# Patient Record
Sex: FEMALE | Race: WHITE | Employment: UNEMPLOYED | URBAN - METROPOLITAN AREA
[De-identification: names, ages, dates, MRNs, and addresses within clinical notes are randomized per-mention and may not be internally consistent; named-entity substitution may affect disease eponyms.]

---

## 2018-05-11 ENCOUNTER — HOSPITAL ENCOUNTER (EMERGENCY)
Age: 23
Discharge: HOME | End: 2018-05-11
Payer: COMMERCIAL

## 2018-05-11 ENCOUNTER — APPOINTMENT (OUTPATIENT)
Dept: GENERAL RADIOLOGY | Age: 23
End: 2018-05-11
Payer: COMMERCIAL

## 2018-05-11 VITALS
DIASTOLIC BLOOD PRESSURE: 83 MMHG | SYSTOLIC BLOOD PRESSURE: 136 MMHG | OXYGEN SATURATION: 98 % | RESPIRATION RATE: 16 BRPM | TEMPERATURE: 98.3 F | HEART RATE: 113 BPM

## 2018-05-11 DIAGNOSIS — L03.012 PARONYCHIA OF FINGER OF LEFT HAND: Primary | ICD-10-CM

## 2018-05-11 PROCEDURE — 99212 OFFICE O/P EST SF 10 MIN: CPT

## 2018-05-11 PROCEDURE — 73130 X-RAY EXAM OF HAND: CPT

## 2018-05-11 RX ORDER — NAPROXEN 500 MG/1
500 TABLET ORAL 2 TIMES DAILY
Qty: 14 TABLET | Refills: 0 | Status: SHIPPED | OUTPATIENT
Start: 2018-05-11 | End: 2018-11-23 | Stop reason: SDUPTHER

## 2018-05-11 RX ORDER — CEPHALEXIN 500 MG/1
500 CAPSULE ORAL 3 TIMES DAILY
Qty: 21 CAPSULE | Refills: 0 | Status: SHIPPED | OUTPATIENT
Start: 2018-05-11 | End: 2018-05-18

## 2018-05-11 ASSESSMENT — PAIN DESCRIPTION - DESCRIPTORS: DESCRIPTORS: ACHING;DISCOMFORT;THROBBING

## 2018-05-11 ASSESSMENT — PAIN DESCRIPTION - PROGRESSION: CLINICAL_PROGRESSION: GRADUALLY WORSENING

## 2018-05-11 ASSESSMENT — PAIN DESCRIPTION - PAIN TYPE: TYPE: ACUTE PAIN

## 2018-05-11 ASSESSMENT — PAIN SCALES - GENERAL: PAINLEVEL_OUTOF10: 8

## 2018-05-11 ASSESSMENT — PAIN DESCRIPTION - ORIENTATION: ORIENTATION: LEFT

## 2018-05-11 ASSESSMENT — PAIN DESCRIPTION - FREQUENCY: FREQUENCY: CONTINUOUS

## 2018-05-11 ASSESSMENT — PAIN DESCRIPTION - LOCATION: LOCATION: FINGER (COMMENT WHICH ONE)

## 2018-07-11 ENCOUNTER — HOSPITAL ENCOUNTER (EMERGENCY)
Age: 23
Discharge: HOME | End: 2018-07-11
Payer: COMMERCIAL

## 2018-07-11 VITALS
BODY MASS INDEX: 36.15 KG/M2 | WEIGHT: 224 LBS | OXYGEN SATURATION: 100 % | SYSTOLIC BLOOD PRESSURE: 148 MMHG | HEART RATE: 112 BPM | DIASTOLIC BLOOD PRESSURE: 91 MMHG | RESPIRATION RATE: 14 BRPM | TEMPERATURE: 98.7 F

## 2018-07-11 DIAGNOSIS — H66.90 ACUTE OTITIS MEDIA, UNSPECIFIED OTITIS MEDIA TYPE: Primary | ICD-10-CM

## 2018-07-11 DIAGNOSIS — J02.9 ACUTE PHARYNGITIS, UNSPECIFIED ETIOLOGY: ICD-10-CM

## 2018-07-11 LAB — STREP GRP A PCR: NEGATIVE

## 2018-07-11 PROCEDURE — 99212 OFFICE O/P EST SF 10 MIN: CPT

## 2018-07-11 PROCEDURE — 87880 STREP A ASSAY W/OPTIC: CPT

## 2018-07-11 RX ORDER — AMOXICILLIN 500 MG/1
500 CAPSULE ORAL 3 TIMES DAILY
Qty: 30 CAPSULE | Refills: 0 | Status: SHIPPED | OUTPATIENT
Start: 2018-07-11 | End: 2018-07-21

## 2018-07-11 ASSESSMENT — PAIN SCALES - GENERAL: PAINLEVEL_OUTOF10: 5

## 2018-07-11 ASSESSMENT — PAIN DESCRIPTION - DESCRIPTORS: DESCRIPTORS: ACHING

## 2018-07-11 ASSESSMENT — PAIN DESCRIPTION - LOCATION: LOCATION: HEAD

## 2018-07-11 ASSESSMENT — PAIN DESCRIPTION - PAIN TYPE: TYPE: ACUTE PAIN

## 2018-07-11 NOTE — ED PROVIDER NOTES
or PTA. Neck:  Supple, full ROM, no asymmetry, no meningeal signs. No stridor. There is no  anterior cervical node tenderness. Lungs:  Clear to auscultation and breath sounds equal.    CV: Regular rate and rhythm, normal heart sounds, without pathological murmurs, ectopy, gallops, or rubs. Abdomen:  Soft, nontender, good bowel sounds. No organomegaly,   Skin:  Warm and dry, No rashes, no erythema present. Lymphatics: No lymphangitis. no adenopathy noted. Neurological:  GCS 15, Oriented. Motor functions intact.    -------------------Nursing Notes / Prior Records & Vitals Reviewed Section----------------------   (The nursing notes within the ED encounter, home medications, current encounter or past encounter records and vital signs as below have been reviewed)   BP (!) 148/91   Pulse 112   Temp 98.7 °F (37.1 °C) (Oral)   Resp 14   Wt 224 lb (101.6 kg)   LMP 07/01/2018   SpO2 100%   BMI 36.15 kg/m²   Oxygen Saturation Interpretation: Normal.  -------------------------------------------Test Results Section---------------------------------------------  (All laboratory and radiology results have been personally reviewed by myself)  Laboratory:  Results for orders placed or performed during the hospital encounter of 07/11/18   Strep Screen Group A Throat   Result Value Ref Range    Strep Grp A PCR Negative Negative       Radiology: All Radiology results interpreted by Radiologist unless otherwise noted. No orders to display     -----------------------------ED Course / Medical Decision Making Section--------------------------  ED Course Medications:  Medications - No data to display    Medical Decision Making:   Pharyngitis, rapid strep negative. No evidence of PTA, RP abscess, epiglottitis, ludwigs angina, or other life threatening or deep space infection or airway compromise. Left otitis     Counseling:    The emergency provider has spoken with the patient and discussed todays results, in addition to providing specific details for the plan of care and counseling regarding the diagnosis and prognosis. Questions are answered at this time and they are agreeable with the plan.  ------------------------------------Impression & Disposition Section------------------------------------  Impression(s):  1. Acute otitis media, unspecified otitis media type    2. Acute pharyngitis, unspecified etiology        Disposition:  Disposition: Discharge to home  Patient condition is good    New Prescriptions    AMOXICILLIN (AMOXIL) 500 MG CAPSULE    Take 1 capsule by mouth 3 times daily for 10 days    BENZOCAINE-MENTHOL (CEPACOL SORE THROAT) 15-2.6 MG LOZG LOZENGE    Take 1 lozenge by mouth every 2 hours as needed for Sore Throat     * NOTE: This report was transcribed using voice recognition software. Every effort was made to ensure accuracy; however, inadvertent computerized transcription errors may be present.            KIRAN Schrader - MUKESH  07/11/18 7450

## 2018-08-18 ENCOUNTER — HOSPITAL ENCOUNTER (EMERGENCY)
Age: 23
Discharge: HOME | End: 2018-08-18
Payer: COMMERCIAL

## 2018-08-18 VITALS
RESPIRATION RATE: 16 BRPM | HEART RATE: 108 BPM | DIASTOLIC BLOOD PRESSURE: 80 MMHG | SYSTOLIC BLOOD PRESSURE: 140 MMHG | WEIGHT: 238 LBS | TEMPERATURE: 98.7 F | OXYGEN SATURATION: 98 % | BODY MASS INDEX: 38.41 KG/M2

## 2018-08-18 DIAGNOSIS — T14.8XXA BRUISE: Primary | ICD-10-CM

## 2018-08-18 PROCEDURE — 99212 OFFICE O/P EST SF 10 MIN: CPT

## 2018-08-18 NOTE — ED PROVIDER NOTES
HPI:  8/18/18, Time: 6:20 PM         Angelina Bradford is a 24 y.o. female presenting to the ED for evaluation of her left index fingernail. SHe gets acrylic nails put on and the acrylic nail came off of her  Left index finger. The tip of the real nail is a little discolored and  Patient is concerned that she has a fungal infection on her nail. Review of Systems:   Pertinent positives and negatives are stated within HPI, all other systems reviewed and are negative.          --------------------------------------------- PAST HISTORY ---------------------------------------------  Past Medical History:  has a past medical history of Drug abuse and Hyperhidrosis. Past Surgical History:  has no past surgical history on file. Social History:  reports that she has been smoking Cigarettes. She has been smoking about 1.00 pack per day. She has never used smokeless tobacco. She reports that she does not drink alcohol or use drugs. Family History: family history includes Cancer in her mother. The patients home medications have been reviewed. Allergies: Patient has no known allergies. -------------------------------------------------- RESULTS -------------------------------------------------  All laboratory and radiology results have been personally reviewed by myself   LABS:  No results found for this visit on 08/18/18. RADIOLOGY:  Interpreted by Radiologist.  No orders to display       ------------------------- NURSING NOTES AND VITALS REVIEWED ---------------------------   The nursing notes within the ED encounter and vital signs as below have been reviewed.    BP (!) 140/80   Pulse 108   Temp 98.7 °F (37.1 °C) (Oral)   Resp 16   Wt 238 lb (108 kg)   LMP 08/11/2018   SpO2 98%   BMI 38.41 kg/m²   Oxygen Saturation Interpretation: Normal      ---------------------------------------------------PHYSICAL EXAM--------------------------------------      Constitutional/General: Alert and [facility]

## 2018-11-23 ENCOUNTER — HOSPITAL ENCOUNTER (EMERGENCY)
Age: 23
Discharge: HOME | End: 2018-11-23
Payer: COMMERCIAL

## 2018-11-23 ENCOUNTER — APPOINTMENT (OUTPATIENT)
Dept: GENERAL RADIOLOGY | Age: 23
End: 2018-11-23
Payer: COMMERCIAL

## 2018-11-23 VITALS
WEIGHT: 223 LBS | OXYGEN SATURATION: 95 % | DIASTOLIC BLOOD PRESSURE: 72 MMHG | SYSTOLIC BLOOD PRESSURE: 150 MMHG | HEART RATE: 108 BPM | RESPIRATION RATE: 18 BRPM | TEMPERATURE: 98.6 F | BODY MASS INDEX: 35.84 KG/M2 | HEIGHT: 66 IN

## 2018-11-23 DIAGNOSIS — M79.671 RIGHT FOOT PAIN: ICD-10-CM

## 2018-11-23 DIAGNOSIS — M72.2 PLANTAR FASCIITIS OF RIGHT FOOT: Primary | ICD-10-CM

## 2018-11-23 PROCEDURE — 73630 X-RAY EXAM OF FOOT: CPT

## 2018-11-23 PROCEDURE — 99283 EMERGENCY DEPT VISIT LOW MDM: CPT

## 2018-11-23 RX ORDER — IBUPROFEN 800 MG/1
800 TABLET ORAL EVERY 6 HOURS PRN
COMMUNITY
End: 2019-08-07

## 2018-11-23 RX ORDER — NAPROXEN 500 MG/1
500 TABLET ORAL 2 TIMES DAILY
Qty: 30 TABLET | Refills: 0 | Status: SHIPPED | OUTPATIENT
Start: 2018-11-23 | End: 2021-05-13

## 2018-11-23 ASSESSMENT — PAIN DESCRIPTION - LOCATION: LOCATION: FOOT

## 2018-11-23 ASSESSMENT — PAIN DESCRIPTION - PAIN TYPE: TYPE: ACUTE PAIN

## 2018-11-23 ASSESSMENT — PAIN DESCRIPTION - ORIENTATION: ORIENTATION: RIGHT

## 2018-11-23 ASSESSMENT — PAIN SCALES - GENERAL: PAINLEVEL_OUTOF10: 8

## 2018-11-23 NOTE — ED PROVIDER NOTES
smoked 1.00 pack per day. She has never used smokeless tobacco. She reports that she does not drink alcohol or use drugs. Family History: family history includes Cancer in her mother. The patients home medications have been reviewed. Allergies: Patient has no known allergies. --------------------------------- RESULTS ------------------------------------------  All laboratory and radiology results have been personally reviewed by myself   LABS:  No results found for this visit on 11/23/18. RADIOLOGY:  Interpreted by Radiologist.  XR FOOT RIGHT (MIN 3 VIEWS)   Final Result   No acute findings. ----------------- NURSING NOTES AND VITALS REVIEWED ---------------   The nursing notes within the ED encounter and vital signs as below have been reviewed. BP (!) 150/72   Pulse 108   Temp 98.6 °F (37 °C) (Oral)   Resp 18   Ht 5' 6\" (1.676 m)   Wt 223 lb (101.2 kg)   SpO2 95%   BMI 35.99 kg/m²   Oxygen Saturation Interpretation: Normal      --------------------------------PHYSICAL EXAM------------------------------------      Constitutional/General: Alert and oriented x3, well appearing, non toxic in NAD  Head: NC/AT  Eyes: PERRL, EOMI  Mouth: Oropharynx clear, handling secretions, no trismus  Neck: Supple, full ROM, no meningeal signs  Pulmonary: Lungs clear to auscultation bilaterally, no wheezes, rales, or rhonchi. Not in respiratory distress  Cardiovascular:  Regular rate and rhythm, no murmurs, gallops, or rubs. 2+ distal pulses  Abdomen: Soft, + BS. No distension. Nontender. No palpable rigidity, rebound or guarding  Extremities: Moves all extremities x 3. Exam of right foot without injury or trauma. No visible swelling or redness. She is tender plantar surface right heel. . Warm and well perfused  Skin: warm and dry without rash  Neurologic: GCS 15,  Intact.   No focal deficits  Psych: Normal Affect      ------------------------ ED COURSE/MEDICAL DECISION MAKING----------------------  Medications - No data to display      Medical Decision Making:    X-rays of the right foot are unremarkable. No acute bony abnormality or purpura. By description she could have plantar fasciitis that the pain seems to get worse the longer she is on it. They have recommended anti-inflammatories rest and ice. Follow-up with podiatry if any persistent symptoms. I did not give her anything stronger for pain. She has a small child at home that she is responsible for 100% of the time       Counseling: The emergency provider has spoken with the patient and discussed todays results, in addition to providing specific details for the plan of care and counseling regarding the diagnosis and prognosis. Questions are answered at this time and they are agreeable with the plan.      ------------------------ IMPRESSION AND DISPOSITION -------------------------------    IMPRESSION  1. Plantar fasciitis of right foot    2.  Right foot pain        DISPOSITION  Disposition: Discharge to home  Patient condition is stable                   Shiraz Chamberlain PA-C  11/23/18 2105

## 2019-08-07 ENCOUNTER — HOSPITAL ENCOUNTER (EMERGENCY)
Age: 24
Discharge: HOME | End: 2019-08-07
Payer: COMMERCIAL

## 2019-08-07 VITALS
WEIGHT: 196 LBS | DIASTOLIC BLOOD PRESSURE: 78 MMHG | HEART RATE: 108 BPM | OXYGEN SATURATION: 99 % | BODY MASS INDEX: 31.64 KG/M2 | SYSTOLIC BLOOD PRESSURE: 135 MMHG | TEMPERATURE: 98.7 F | RESPIRATION RATE: 16 BRPM

## 2019-08-07 DIAGNOSIS — J20.9 ACUTE BRONCHITIS DUE TO INFECTION: Primary | ICD-10-CM

## 2019-08-07 PROCEDURE — 99212 OFFICE O/P EST SF 10 MIN: CPT

## 2019-08-07 RX ORDER — AMOXICILLIN 500 MG/1
500 CAPSULE ORAL 3 TIMES DAILY
Qty: 30 CAPSULE | Refills: 0 | Status: SHIPPED | OUTPATIENT
Start: 2019-08-07 | End: 2019-08-17

## 2019-08-07 RX ORDER — METHYLPREDNISOLONE 4 MG/1
TABLET ORAL
Qty: 1 KIT | Refills: 0 | Status: SHIPPED | OUTPATIENT
Start: 2019-08-07 | End: 2019-08-13

## 2019-08-07 RX ORDER — LORAZEPAM 1 MG/1
1 TABLET ORAL EVERY 6 HOURS PRN
COMMUNITY
End: 2021-05-13

## 2019-08-07 ASSESSMENT — PAIN DESCRIPTION - ORIENTATION: ORIENTATION: RIGHT

## 2019-08-07 ASSESSMENT — PAIN DESCRIPTION - LOCATION: LOCATION: EAR

## 2019-08-07 ASSESSMENT — PAIN DESCRIPTION - DESCRIPTORS: DESCRIPTORS: ACHING

## 2019-08-27 ENCOUNTER — HOSPITAL ENCOUNTER (EMERGENCY)
Age: 24
Discharge: HOME | End: 2019-08-27
Payer: COMMERCIAL

## 2019-08-27 VITALS
SYSTOLIC BLOOD PRESSURE: 128 MMHG | OXYGEN SATURATION: 98 % | HEART RATE: 95 BPM | DIASTOLIC BLOOD PRESSURE: 92 MMHG | TEMPERATURE: 98.7 F | RESPIRATION RATE: 18 BRPM

## 2019-08-27 DIAGNOSIS — B00.1 HERPES LABIALIS: Primary | ICD-10-CM

## 2019-08-27 PROCEDURE — 96372 THER/PROPH/DIAG INJ SC/IM: CPT

## 2019-08-27 PROCEDURE — 99282 EMERGENCY DEPT VISIT SF MDM: CPT

## 2019-08-27 PROCEDURE — 6360000002 HC RX W HCPCS: Performed by: NURSE PRACTITIONER

## 2019-08-27 RX ORDER — CEPHALEXIN 500 MG/1
500 CAPSULE ORAL 4 TIMES DAILY
Qty: 28 CAPSULE | Refills: 0 | Status: SHIPPED | OUTPATIENT
Start: 2019-08-27 | End: 2019-09-03

## 2019-08-27 RX ORDER — VALACYCLOVIR HYDROCHLORIDE 1 G/1
2000 TABLET, FILM COATED ORAL 2 TIMES DAILY
Qty: 4 TABLET | Refills: 0 | Status: SHIPPED | OUTPATIENT
Start: 2019-08-27 | End: 2019-08-28

## 2019-08-27 RX ORDER — KETOROLAC TROMETHAMINE 30 MG/ML
30 INJECTION, SOLUTION INTRAMUSCULAR; INTRAVENOUS ONCE
Status: COMPLETED | OUTPATIENT
Start: 2019-08-27 | End: 2019-08-27

## 2019-08-27 RX ADMIN — KETOROLAC TROMETHAMINE 30 MG: 30 INJECTION, SOLUTION INTRAMUSCULAR; INTRAVENOUS at 17:08

## 2019-08-27 ASSESSMENT — PAIN SCALES - GENERAL: PAINLEVEL_OUTOF10: 7

## 2019-08-27 NOTE — ED PROVIDER NOTES
[facility]     Department of Emergency Medicine   ED  Provider Note  Admit Date/RoomTime: 8/27/2019  4:16 PM  ED Room: /[facility]  Chief Complaint   Other (multiple sores around mouth and under tongue. Patient states she was diagnosed with impetigo at another hospital and was getting medication but it is getting worse )    History of Present Illness   Source of history provided by:  patient. History/Exam Limitations: none. Shanna Solitario is a 21 y.o. old female with has a past medical history of:   Past Medical History:   Diagnosis Date    Drug abuse ([facility] [address]     Hyperhidrosis     presents to the emergency department by private vehicle, for complaint of sudden onset red and painful area on  upper and lower lip which began a few day(s) prior to arrival.  The symptoms were caused by no injury, trauma or exposure but does have a history of herpes simplex and felt it was possibly due to that. Was seen at [facility] and diagnosed with impetigo, started on penicillin oral with no additional treatment. Since onset the symptoms have been persistent and have not improved with treatment prescribed. Prior history of similar episodes: Yes, but patient admits that it does look slightly different than her previous episodes of cold sores. Her symptoms are associated with rash and pain and relieved by nothing. She denies any hives, welts, difficulty breathing, difficulty swallowing, wheezing, throat tightness, hoarseness, stridor, itching, lightheadedness, dizziness, facial swelling, lip swelling or tongue swelling. Denies any associated fever or chills. Denies chest pains, shortness of breath, cough with phlegm or hemoptysis. Denies abdominal pains, nausea, vomiting, change in bowel patterns, hematochezia or melena. Denies dysuria, hematuria, suprapubic or flank pains. ROS    Pertinent positives and negatives are stated within HPI, all other systems reviewed and are negative.     No past surgical history on file. Social History:  reports that she has been smoking cigarettes. She has been smoking about 1.00 pack per day. She has never used smokeless tobacco. She reports that she does not drink alcohol or use drugs. Family History: family history includes Cancer in her mother. Allergies: Patient has no known allergies. Physical Exam           ED Triage Vitals   BP Temp Temp Source Pulse Resp SpO2 Height Weight   08/27/19 1607 08/27/19 1549 08/27/19 1549 08/27/19 1549 08/27/19 1607 08/27/19 1549 -- --   (!) 128/92 98.7 °F (37.1 °C) Temporal 80 18 98 %       Oxygen Saturation Interpretation: Normal.    Constitutional:  Alert, development consistent with age. HEENT:  NC/NT. Airway patent. Eyes:  PERRL, EOMI, no discharge. Sclera anicteric. Ears:  TMs without perforation, injection, or bulging. External canals clear without exudate. Mouth:  Mucous membranes moist without lesions, tongue and gums normal.  No buccal, intraoral, sublingual or lingual lesions are noted  Throat:  Pharynx without injection, exudate, or tonsillar hypertrophy. Airway patient. Neck:  Supple. No lymphadenopathy. No stridor  Respiratory:  Clear to auscultation and breath sounds equal. Air entry is normal.  CV:  Regular rate and rhythm. GI:  Abdomen Soft, nontender, +BS. Integument:  Skin turgor: Normal.              Round-2-oval-shaped lesions noted x2 with one being on the left lower lip crossing the vermilion border and 1 Being Right Upper Lip Crossing the vermilion border with pink moist wound bases and yellow crusted drainage noted atop both with no secondary local or extensive cellulitis and no erythema, rash or swelling noted. Neurological:  Orientation age-appropriate unless noted elseware. Motor functions intact. Lab / Imaging Results   (All laboratory and radiology results have been personally reviewed by myself)  Labs:  No results found for this visit on 08/27/19. Imaging:   All Radiology results interpreted

## 2021-03-22 ENCOUNTER — HOSPITAL ENCOUNTER (OUTPATIENT)
Age: 26
Discharge: HOME | End: 2021-03-22
Payer: COMMERCIAL

## 2021-03-22 LAB
ALBUMIN SERPL-MCNC: 4.5 G/DL (ref 3.5–5.2)
ALP BLD-CCNC: 84 U/L (ref 35–104)
ALT SERPL-CCNC: 44 U/L (ref 0–32)
ANION GAP SERPL CALCULATED.3IONS-SCNC: 14 MMOL/L (ref 7–16)
AST SERPL-CCNC: 34 U/L (ref 0–31)
BASOPHILS ABSOLUTE: 0.04 E9/L (ref 0–0.2)
BASOPHILS RELATIVE PERCENT: 0.4 % (ref 0–2)
BILIRUB SERPL-MCNC: 0.3 MG/DL (ref 0–1.2)
BUN BLDV-MCNC: 17 MG/DL (ref 6–20)
CALCIUM SERPL-MCNC: 9.4 MG/DL (ref 8.6–10.2)
CHLORIDE BLD-SCNC: 102 MMOL/L (ref 98–107)
CHOLESTEROL, TOTAL: 204 MG/DL (ref 0–199)
CO2: 23 MMOL/L (ref 22–29)
CREAT SERPL-MCNC: 0.9 MG/DL (ref 0.5–1)
EOSINOPHILS ABSOLUTE: 0.05 E9/L (ref 0.05–0.5)
EOSINOPHILS RELATIVE PERCENT: 0.5 % (ref 0–6)
GFR AFRICAN AMERICAN: >60
GFR NON-AFRICAN AMERICAN: >60 ML/MIN/1.73
GLUCOSE BLD-MCNC: 95 MG/DL (ref 74–99)
HCT VFR BLD CALC: 42.2 % (ref 34–48)
HDLC SERPL-MCNC: 45 MG/DL
HEMOGLOBIN: 14.7 G/DL (ref 11.5–15.5)
IMMATURE GRANULOCYTES #: 0.06 E9/L
IMMATURE GRANULOCYTES %: 0.6 % (ref 0–5)
LDL CHOLESTEROL CALCULATED: 124 MG/DL (ref 0–99)
LYMPHOCYTES ABSOLUTE: 2.41 E9/L (ref 1.5–4)
LYMPHOCYTES RELATIVE PERCENT: 25 % (ref 20–42)
MCH RBC QN AUTO: 32.7 PG (ref 26–35)
MCHC RBC AUTO-ENTMCNC: 34.8 % (ref 32–34.5)
MCV RBC AUTO: 93.8 FL (ref 80–99.9)
MONOCYTES ABSOLUTE: 0.51 E9/L (ref 0.1–0.95)
MONOCYTES RELATIVE PERCENT: 5.3 % (ref 2–12)
NEUTROPHILS ABSOLUTE: 6.57 E9/L (ref 1.8–7.3)
NEUTROPHILS RELATIVE PERCENT: 68.2 % (ref 43–80)
PDW BLD-RTO: 12.2 FL (ref 11.5–15)
PLATELET # BLD: 367 E9/L (ref 130–450)
PMV BLD AUTO: 9.9 FL (ref 7–12)
POTASSIUM SERPL-SCNC: 4.1 MMOL/L (ref 3.5–5)
RBC # BLD: 4.5 E12/L (ref 3.5–5.5)
SODIUM BLD-SCNC: 139 MMOL/L (ref 132–146)
T4 FREE: 0.96 NG/DL (ref 0.93–1.7)
TOTAL PROTEIN: 7.5 G/DL (ref 6.4–8.3)
TRIGL SERPL-MCNC: 173 MG/DL (ref 0–149)
TSH SERPL DL<=0.05 MIU/L-ACNC: 2.28 UIU/ML (ref 0.27–4.2)
VLDLC SERPL CALC-MCNC: 35 MG/DL
WBC # BLD: 9.6 E9/L (ref 4.5–11.5)

## 2021-03-22 PROCEDURE — 80061 LIPID PANEL: CPT

## 2021-03-22 PROCEDURE — 84439 ASSAY OF FREE THYROXINE: CPT

## 2021-03-22 PROCEDURE — 85025 COMPLETE CBC W/AUTO DIFF WBC: CPT

## 2021-03-22 PROCEDURE — 80053 COMPREHEN METABOLIC PANEL: CPT

## 2021-03-22 PROCEDURE — 36415 COLL VENOUS BLD VENIPUNCTURE: CPT

## 2021-03-22 PROCEDURE — 84443 ASSAY THYROID STIM HORMONE: CPT

## 2021-03-31 ENCOUNTER — TELEPHONE (OUTPATIENT)
Dept: SLEEP CENTER | Age: 26
End: 2021-03-31

## 2021-04-14 ENCOUNTER — TELEPHONE (OUTPATIENT)
Dept: SLEEP CENTER | Age: 26
End: 2021-04-14

## 2021-05-13 ENCOUNTER — PROCEDURE VISIT (OUTPATIENT)
Dept: AUDIOLOGY | Age: 26
End: 2021-05-13
Payer: COMMERCIAL

## 2021-05-13 ENCOUNTER — OFFICE VISIT (OUTPATIENT)
Dept: ENT CLINIC | Age: 26
End: 2021-05-13
Payer: COMMERCIAL

## 2021-05-13 VITALS
SYSTOLIC BLOOD PRESSURE: 143 MMHG | WEIGHT: 239 LBS | HEART RATE: 112 BPM | HEIGHT: 66 IN | BODY MASS INDEX: 38.41 KG/M2 | DIASTOLIC BLOOD PRESSURE: 110 MMHG

## 2021-05-13 DIAGNOSIS — J35.01 CHRONIC TONSILLITIS: ICD-10-CM

## 2021-05-13 DIAGNOSIS — J35.8 TONSIL STONE: ICD-10-CM

## 2021-05-13 DIAGNOSIS — Z01.818 PREOP TESTING: ICD-10-CM

## 2021-05-13 DIAGNOSIS — H65.493 COME (CHRONIC OTITIS MEDIA WITH EFFUSION), BILATERAL: Primary | ICD-10-CM

## 2021-05-13 DIAGNOSIS — J35.1 TONSILLAR HYPERTROPHY: ICD-10-CM

## 2021-05-13 DIAGNOSIS — H65.493 COME (CHRONIC OTITIS MEDIA WITH EFFUSION), BILATERAL: ICD-10-CM

## 2021-05-13 LAB
HCT VFR BLD CALC: 41.6 % (ref 34–48)
HEMOGLOBIN: 14.5 G/DL (ref 11.5–15.5)
MCH RBC QN AUTO: 33.6 PG (ref 26–35)
MCHC RBC AUTO-ENTMCNC: 34.9 % (ref 32–34.5)
MCV RBC AUTO: 96.3 FL (ref 80–99.9)
PDW BLD-RTO: 12.9 FL (ref 11.5–15)
PLATELET # BLD: 382 E9/L (ref 130–450)
PMV BLD AUTO: 9.9 FL (ref 7–12)
RBC # BLD: 4.32 E12/L (ref 3.5–5.5)
WBC # BLD: 9.4 E9/L (ref 4.5–11.5)

## 2021-05-13 PROCEDURE — 99203 OFFICE O/P NEW LOW 30 MIN: CPT | Performed by: NURSE PRACTITIONER

## 2021-05-13 PROCEDURE — 92567 TYMPANOMETRY: CPT | Performed by: AUDIOLOGIST

## 2021-05-13 PROCEDURE — G8417 CALC BMI ABV UP PARAM F/U: HCPCS | Performed by: NURSE PRACTITIONER

## 2021-05-13 PROCEDURE — G8427 DOCREV CUR MEDS BY ELIG CLIN: HCPCS | Performed by: NURSE PRACTITIONER

## 2021-05-13 PROCEDURE — 1036F TOBACCO NON-USER: CPT | Performed by: NURSE PRACTITIONER

## 2021-05-13 RX ORDER — CHLORHEXIDINE GLUCONATE 0.12 MG/ML
15 RINSE ORAL 2 TIMES DAILY
Qty: 420 ML | Refills: 0 | Status: SHIPPED
Start: 2021-05-13 | End: 2021-05-14

## 2021-05-13 RX ORDER — VILAZODONE HYDROCHLORIDE 20 MG/1
40 TABLET ORAL DAILY
COMMUNITY
Start: 2021-04-23

## 2021-05-13 RX ORDER — CETIRIZINE HYDROCHLORIDE 10 MG/1
10 TABLET ORAL DAILY
COMMUNITY
Start: 2021-04-21 | End: 2021-05-14

## 2021-05-13 RX ORDER — CLONAZEPAM 0.5 MG/1
TABLET ORAL
COMMUNITY
Start: 2021-03-25 | End: 2021-07-02 | Stop reason: ALTCHOICE

## 2021-05-13 RX ORDER — METHYLPREDNISOLONE 4 MG/1
4 TABLET ORAL SEE ADMIN INSTRUCTIONS
Qty: 1 KIT | Refills: 0 | Status: SHIPPED
Start: 2021-05-13 | End: 2021-05-14

## 2021-05-13 RX ORDER — HYDROXYZINE HYDROCHLORIDE 25 MG/1
TABLET, FILM COATED ORAL
COMMUNITY
Start: 2021-05-06

## 2021-05-13 RX ORDER — AZELASTINE 1 MG/ML
2 SPRAY, METERED NASAL 2 TIMES DAILY
Qty: 4 BOTTLE | Refills: 1 | Status: SHIPPED
Start: 2021-05-13 | End: 2021-05-14

## 2021-05-13 SDOH — HEALTH STABILITY: MENTAL HEALTH: HOW OFTEN DO YOU HAVE A DRINK CONTAINING ALCOHOL?: NOT ASKED

## 2021-05-13 SDOH — HEALTH STABILITY: MENTAL HEALTH: HOW MANY STANDARD DRINKS CONTAINING ALCOHOL DO YOU HAVE ON A TYPICAL DAY?: NOT ASKED

## 2021-05-13 NOTE — PROGRESS NOTES
This patient was referred for tympanometric testing by JUDI Williamson NP due to COME, bilaterally. Tympanometry revealed normal middle ear peak pressure and reduced compliance, bilaterally. Ipsilateral acoustic reflexes were absent, right ear and present, left ear at 1000Hz. The results were reviewed with the patient. Recommendations for follow up will be made pending physician consult.     Froilan Quijano CCC/SCARLETT  Audiologist  [id number]  NPI#:  [id number]

## 2021-05-13 NOTE — PROGRESS NOTES
[id number] [facility]  Dr. Jamey Hodges. RIMA Lopez Ms.Ed. New Consult       Patient Name:  Dominique Ramos  :  1995     CHIEF C/O:    Chief Complaint   Patient presents with    Other     tonsil stones    Ear Problem     chronic ear infection       HISTORY OBTAINED FROM:  patient    HISTORY OF PRESENT ILLNESS:       Vinod  is a 31y.o. year old female, here today for ear infections and tonsil stones. States she has had chronic ear infections since she was a child with no previous history of tubes or other ear surgeries. She does complain of recurrent ear pain with frequent treatment with amoxicillin and drops by her PCP in urgent care. She denies any hearing loss, tinnitus, or vertigo. She states that she is suffering from worsened allergy symptoms this year including congestion, rhinorrhea, postnasal drainage which she is taking Zyrtec for as needed. She also started Flonase 1 month ago. She denies any change in the pressure in her ears. She does have a persistent history of TMJ and bruxism and wears a mouthguard nightly. She also states she has been suffering from tonsil stones for the past 1 year. She states when she is given a Z-Madhu they go away but returned a short time after. She states they are very large with a very foul odor. She complains of a chronic sore throat but denies any difficulty swallowing or changes to her voice. Denies any previous history of chronic strep infections. She states she is a former smoker and currently vapes. Past Medical History:   Diagnosis Date    Drug abuse ([facility] [address]     Hyperhidrosis      History reviewed. No pertinent surgical history.     Current Outpatient Medications:     clonazePAM (KLONOPIN) 0.5 MG tablet, TAKE 1 TABLET BY MOUTH TWICE DAILY AS NEEDED, Disp: , Rfl:     cetirizine (ZYRTEC) 10 MG tablet, Take 10 mg by mouth daily, Disp: , Rfl:     VILAZODONE HCL 20 MG Tablet, TAKE 1 TABLET BY MOUTH ONCE DAILY, Disp: , Rfl:     hydrOXYzine (ATARAX) 25 MG tablet, TAKE 1 TABLET BY MOUTH THREE TIMES DAILY AS NEEDED, Disp: , Rfl:     OMEPRAZOLE PO, Take by mouth, Disp: , Rfl:     glycopyrrolate (ROBINUL) 2 MG tablet, Take 2 mg by mouth 5 times daily 6 mg in the am and 4 mg in the pm. , Disp: , Rfl:   Patient has no known allergies. Social History     Tobacco Use    Smoking status: Former Smoker     Packs/day: 1.00     Types: Cigarettes     Quit date: 2021     Years since quittin.0    Smokeless tobacco: Never Used   Substance Use Topics    Alcohol use: Yes    Drug use: No     Types: IV     Family History   Problem Relation Age of Onset    Cancer Mother        Review of Systems   Constitutional: Negative. Negative for activity change and appetite change. HENT: Positive for congestion, ear pain, postnasal drip, rhinorrhea and sore throat. Negative for ear discharge. Eyes: Negative. Respiratory: Negative. Negative for shortness of breath and stridor. Cardiovascular: Negative. Negative for chest pain and palpitations. Endocrine: Negative. Musculoskeletal: Negative. Skin: Negative. Neurological: Negative. Negative for dizziness. Hematological: Negative. Psychiatric/Behavioral: Negative. BP (!) 143/110 (Site: Left Upper Arm, Position: Sitting, Cuff Size: Large Adult)   Pulse 112   Ht 5' 6\" (1.676 m)   Wt 239 lb (108.4 kg)   LMP 2021   BMI 38.58 kg/m²   Physical Exam  Constitutional:       Appearance: Normal appearance. HENT:      Head: Normocephalic. Right Ear: Ear canal and external ear normal. A middle ear effusion is present. Tympanic membrane is scarred. Tympanic membrane has decreased mobility. Left Ear: Ear canal and external ear normal. A middle ear effusion is present. Tympanic membrane is scarred. Tympanic membrane has decreased mobility. Nose: Rhinorrhea present. Rhinorrhea is clear. Right Turbinates: Pale. Left Turbinates: Pale.       Mouth/Throat: Lips: Pink. Mouth: Mucous membranes are moist.      Pharynx: No oropharyngeal exudate or posterior oropharyngeal erythema. Tonsils: No tonsillar exudate or tonsillar abscesses. 2+ on the right. 2+ on the left. Comments: 2+ cryptic tonsils bilaterally with irregular shape. Eyes:      Conjunctiva/sclera: Conjunctivae normal.      Pupils: Pupils are equal, round, and reactive to light. Cardiovascular:      Rate and Rhythm: Normal rate and regular rhythm. Pulses: Normal pulses. Pulmonary:      Effort: Pulmonary effort is normal. No respiratory distress. Breath sounds: No stridor. Musculoskeletal:         General: Normal range of motion. Cervical back: Normal range of motion. No rigidity. No muscular tenderness. Skin:     General: Skin is warm and dry. Neurological:      General: No focal deficit present. Mental Status: She is alert and oriented to person, place, and time. Psychiatric:         Mood and Affect: Mood normal.         Behavior: Behavior normal.         Thought Content: Thought content normal.         Judgment: Judgment normal.         IMPRESSION/PLAN:  Maria L was seen today for other and ear problem. Diagnoses and all orders for this visit:    COME (chronic otitis media with effusion), bilateral  -     Tympanometry; Future    Tonsillar hypertrophy    Chronic tonsillitis    Tonsil stone    Other orders  -     Discontinue: azelastine (ASTELIN) 0.1 % nasal spray; 2 sprays by Nasal route 2 times daily Use in each nostril as directed  -     Discontinue: chlorhexidine (PERIDEX) 0.12 % solution; Take 15 mLs by mouth 2 times daily for 14 days  -     Discontinue: methylPREDNISolone (MEDROL DOSEPACK) 4 MG tablet; Take 1 tablet by mouth See Admin Instructions for 6 days Take by mouth. Guille Crespo  49. is seen today for multiple complaints including recurrent ear infections, allergy symptoms, and tonsil stones.   Upon exam bilateral TMs are scarred with mild middle ear effusions. There is no sign of current infection. There is tenderness of bilateral TMJ joints. Bilateral ear canals are without erythema or edema. Sinuses are patent bilaterally with mild pallor appearance of the inferior turbinates, clear rhinorrhea, and postnasal drainage. Tonsils are 2+ bilaterally with irregular shape and cryptic appearance. There is no current stones, erythema, or exudate from either tonsil. At this time she will continue her Flonase with education on appropriate use of nasal sprays. She will also start on Astelin spray, 2 sprays each nostril up to twice daily as needed for postnasal drainage. Is recommended that she uses this medication twice daily for at least 2 weeks for her postnasal drainage symptoms. She will be given Peridex mouthwash, 15 mL to be gargled twice daily for 14 days. She is also given a prescription for Medrol Dosepak for her TMJ discomfort with instructions to complete followed by NSAID medications and warm compresses for the TMJs. She will follow-up in 1 month. Possible surgical intervention for her tonsils is discussed with the patient's which she would like to refrain from if possible due to family obligations. We will continue all conservative medical therapy as long as patient tolerates.       Coni Allen, MSN, FNP-C  8 [facility], Nose and Thro[facility]    The information contained in this note has been dictated using drug and medical speech recognition software and may contain errors

## 2021-05-14 ENCOUNTER — HOSPITAL ENCOUNTER (EMERGENCY)
Age: 26
Discharge: HOME | End: 2021-05-14
Payer: COMMERCIAL

## 2021-05-14 ENCOUNTER — HOSPITAL ENCOUNTER (OUTPATIENT)
Age: 26
Discharge: HOME | End: 2021-05-16
Payer: COMMERCIAL

## 2021-05-14 VITALS
HEIGHT: 66 IN | TEMPERATURE: 98 F | OXYGEN SATURATION: 97 % | SYSTOLIC BLOOD PRESSURE: 119 MMHG | WEIGHT: 219 LBS | BODY MASS INDEX: 35.2 KG/M2 | HEART RATE: 118 BPM | DIASTOLIC BLOOD PRESSURE: 88 MMHG | RESPIRATION RATE: 18 BRPM

## 2021-05-14 DIAGNOSIS — N76.0 ABSCESS, VAGINA: Primary | ICD-10-CM

## 2021-05-14 PROCEDURE — U0005 INFEC AGEN DETEC AMPLI PROBE: HCPCS

## 2021-05-14 PROCEDURE — U0003 INFECTIOUS AGENT DETECTION BY NUCLEIC ACID (DNA OR RNA); SEVERE ACUTE RESPIRATORY SYNDROME CORONAVIRUS 2 (SARS-COV-2) (CORONAVIRUS DISEASE [COVID-19]), AMPLIFIED PROBE TECHNIQUE, MAKING USE OF HIGH THROUGHPUT TECHNOLOGIES AS DESCRIBED BY CMS-2020-01-R: HCPCS

## 2021-05-14 PROCEDURE — 99212 OFFICE O/P EST SF 10 MIN: CPT

## 2021-05-14 RX ORDER — SULFAMETHOXAZOLE AND TRIMETHOPRIM 800; 160 MG/1; MG/1
1 TABLET ORAL 2 TIMES DAILY
Qty: 20 TABLET | Refills: 0 | Status: SHIPPED | OUTPATIENT
Start: 2021-05-14 | End: 2021-05-14

## 2021-05-14 ASSESSMENT — PAIN DESCRIPTION - LOCATION: LOCATION: VAGINA

## 2021-05-14 ASSESSMENT — PAIN DESCRIPTION - ORIENTATION: ORIENTATION: RIGHT

## 2021-05-14 NOTE — ED PROVIDER NOTES
[address] [facility]  Department of Emergency Medicine   ED  Encounter Note  Admit Date/RoomTime: 2021  8:53 AM  ED Room:   NAME: Julaino Ramos  : 1995  MRN: [id number]     Chief Complaint:  No chief complaint on file. HISTORY OF PRESENT ILLNESS        Maria L Ramos is a 31 y.o. female who presents to the ED with a complaint of a vaginal sore. Patient states a couple days ago she was feeling very uncomfortable in the vaginal area. Sore and pressure-like. Did actually get in to see her gynecologist yesterday. He diagnosed her with a vaginal wall cyst and scheduled her for outpatient surgery this coming week. Patient states yesterday she became even more uncomfortable in the vaginal area. States that she is feeling a lump to the right side of her vagina. Very tender to touch. Feels swollen and lumpy. States she soaked in a tub to try to help it, but no relief. Did call her gynecology office back. They were concerned that perhaps she has herpes and they did prescribe herpes medication. Patient has not started those medicines. She does admit that she has had cold sores around her mouth. She has never had vaginal herpes that she knows of. She has had the same sexual partner for many years. She denies any vaginal discharge or vaginal odor. Denies any vaginal itching. Patient denies any fevers or chills. She denies any nausea, vomiting or diarrhea. ROS   Pertinent positives and negatives are stated within HPI, all other systems reviewed and are negative. Past Medical History:  has a past medical history of Drug abuse ([address] and Hyperhidrosis. Surgical History:  has no past surgical history on file. Social History:  reports that she quit smoking about 2 weeks ago. Her smoking use included cigarettes. She smoked 1.00 pack per day. She has never used smokeless tobacco. She reports current alcohol use. She reports that she does not use drugs.     Family History: family history includes Cancer in her mother. Allergies: Patient has no known allergies. PHYSICAL EXAM   Oxygen Saturation Interpretation: Normal.        ED Triage Vitals [05/14/21 0859]   BP Temp Temp Source Pulse Resp SpO2 Height Weight   119/88 98 °F (36.7 °C) Infrared 118 18 97 % 5' 6\" (1.676 m) 219 lb (99.3 kg)       General:  NAD. Alert and Oriented. Well-appearing. Skin:  Warm, dry. No rashes. Head:  Normocephalic. Atraumatic. Eyes:  EOMI. Conjunctiva normal.  ENT:  Oral mucosa moist.  Airway patent. Neck:  Supple. Normal ROM. Respiratory:  No respiratory distress. No labored breathing. Lungs clear without rales, rhonchi or wheezing. Cardiovascular:  Regular rate. No Murmur. No peripheral edema. Extremities warm and good color. Chest:  Abdomen:  Soft, nondistended. Normal bowel sounds. Nontender to palpation all 4 quadrants. Negative rebound, negative guarding. Rectal:  Gu: Bladder nontender and non distended. No CVA tenderness. Pelvic: Performed with nurse Iraida at bedside. To the right labia minora patient had a white cystic head. I did pop it easily with just my fingers. After that about 1 cc of pus came out with manual squeezing. I see no further pimple-like lesions. No herpetic lesions at all. No blisters. There is no folliculitis to her pubic area. Speculum exam does not reveal any obvious lesions to the vaginal walls. There is no vaginal discharge. No vaginal odor. No sores, lesions or redness around her rectal area. Extremities:  Normal ROM. Nontender to palpation. Atraumatic. Back:  Normal ROM. Nontender to palpation. Neuro:  Alert and Oriented to person, place, time and situation. Normal LOC. Moves all extremities. Speech fluent. Psych:  Calm and Cooperative. Normal thought process. Normal judgement.         Lab / Imaging Results   (All laboratory and radiology results have been personally reviewed by myself)  Labs:  No results found for this visit on 05/14/21. Imaging: All Radiology results interpreted by Radiologist unless otherwise noted. No orders to display       ED Course / Medical Decision Making   Medications - No data to display     Re-examination:  5/14/21       Time:   Patients condition . Consult(s):   None    Procedure(s):   none    MDM:   This was an abscess. Definitely not a herpetic outbreak. Patient advised to not take the herpes medication until she is seen by her gynecologist again. She is to call the office today and explain what happened at this visit. They can also look at my note. I am not sure if this is the cyst that her gynecologist was talking about or something different. She is to discuss need for continued surgery with him. Plan of Care/Counseling:  I reviewed today's visit with the patient in addition to providing specific details for the plan of care and counseling regarding the diagnosis and prognosis. Questions are answered at this time and are agreeable with the plan. ASSESSMENT     1. Abscess, vagina Improving     PLAN   Discharge home. Patient condition is good    New Medications     New Prescriptions    SULFAMETHOXAZOLE-TRIMETHOPRIM (BACTRIM DS) 800-160 MG PER TABLET    Take 1 tablet by mouth 2 times daily for 10 days     Electronically signed by EDUARDO Osorio   DD: 5/14/21  **This report was transcribed using voice recognition software. Every effort was made to ensure accuracy; however, inadvertent computerized transcription errors may be present.   END OF ED PROVIDER NOTE       Omi Osorio  05/14/21 0940

## 2021-05-15 LAB — SARS-COV-2, PCR: NOT DETECTED

## 2021-06-09 ASSESSMENT — ENCOUNTER SYMPTOMS
SORE THROAT: 1
RHINORRHEA: 1
EYES NEGATIVE: 1
STRIDOR: 0
RESPIRATORY NEGATIVE: 1
SHORTNESS OF BREATH: 0

## 2021-07-02 ENCOUNTER — OFFICE VISIT (OUTPATIENT)
Dept: ENT CLINIC | Age: 26
End: 2021-07-02
Payer: COMMERCIAL

## 2021-07-02 VITALS
DIASTOLIC BLOOD PRESSURE: 104 MMHG | WEIGHT: 236 LBS | HEART RATE: 113 BPM | BODY MASS INDEX: 37.93 KG/M2 | HEIGHT: 66 IN | SYSTOLIC BLOOD PRESSURE: 156 MMHG

## 2021-07-02 DIAGNOSIS — J30.89 NON-SEASONAL ALLERGIC RHINITIS DUE TO OTHER ALLERGIC TRIGGER: ICD-10-CM

## 2021-07-02 DIAGNOSIS — J35.01 CHRONIC TONSILLITIS: ICD-10-CM

## 2021-07-02 DIAGNOSIS — M26.643 ARTHRITIS OF BOTH TEMPOROMANDIBULAR JOINTS: Primary | ICD-10-CM

## 2021-07-02 DIAGNOSIS — J35.8 TONSIL STONE: ICD-10-CM

## 2021-07-02 PROCEDURE — G8427 DOCREV CUR MEDS BY ELIG CLIN: HCPCS | Performed by: OTOLARYNGOLOGY

## 2021-07-02 PROCEDURE — 1036F TOBACCO NON-USER: CPT | Performed by: OTOLARYNGOLOGY

## 2021-07-02 PROCEDURE — 99213 OFFICE O/P EST LOW 20 MIN: CPT | Performed by: OTOLARYNGOLOGY

## 2021-07-02 PROCEDURE — G8417 CALC BMI ABV UP PARAM F/U: HCPCS | Performed by: OTOLARYNGOLOGY

## 2021-07-02 RX ORDER — ALPRAZOLAM 0.5 MG/1
0.5 TABLET ORAL 2 TIMES DAILY
COMMUNITY

## 2021-07-02 RX ORDER — FLUTICASONE PROPIONATE 50 MCG
2 SPRAY, SUSPENSION (ML) NASAL DAILY
Qty: 1 BOTTLE | Refills: 3 | Status: SHIPPED | OUTPATIENT
Start: 2021-07-02

## 2021-07-02 RX ORDER — AZELASTINE 1 MG/ML
2 SPRAY, METERED NASAL DAILY
Qty: 4 BOTTLE | Refills: 3 | Status: SHIPPED | OUTPATIENT
Start: 2021-07-02

## 2021-07-02 ASSESSMENT — ENCOUNTER SYMPTOMS
EYES NEGATIVE: 1
RESPIRATORY NEGATIVE: 1
ALLERGIC/IMMUNOLOGIC NEGATIVE: 1

## 2021-07-02 NOTE — PROGRESS NOTES
Department of Otolaryngology  Office Consult Note  21          Subjective:        Chief Complaint:  had concerns including Other (NP tonsil stones). Patient ID: Silke Ramos is a 31 y.o. female. HPI: Patient presents as  follow-up for recurrent ear infections, tonsil stones and allergic rhinitis. She was started on Flonase Astelin with some mild persistent nasal congestion. She was started on peridex with no change in occurrence of tonsil stones still producing them frequently. Had 1 ear infection last month treated with amoxicillin at urgent care, she describes ear infection as shooting pain from throat to bilateral ears,worse with eating/chewing/drinking. Denies fever chills otorrhea change in hearing. Never been allergy tested. No recurrent pharyngitis. Has seen dentist for TMJ and has custom mouth guard     Review of Systems   Constitutional: Negative. HENT: Positive for congestion and postnasal drip. Eyes: Negative. Respiratory: Negative. Skin: Negative. Allergic/Immunologic: Negative. Neurological: Negative. Hematological: Negative. Psychiatric/Behavioral: Negative. All other systems reviewed and are negative. Past Medical History:   Diagnosis Date    Drug abuse ([facility]     Hyperhidrosis     Hyperlipidemia      Past Surgical History:   Procedure Laterality Date    ABSCESS DRAINAGE       SECTION         Current Outpatient Medications:     ALPRAZolam (XANAX) 0.5 MG tablet, Take 0.5 mg by mouth nightly as needed for Sleep., Disp: , Rfl:     azelastine (ASTELIN) 0.1 % nasal spray, 2 sprays by Nasal route daily Use in each nostril as directed, Disp: 4 Bottle, Rfl: 3    fluticasone (FLONASE) 50 MCG/ACT nasal spray, 2 sprays by Nasal route daily Use in both nostrils. , Disp: 1 Bottle, Rfl: 3    montelukast (SINGULAIR) 10 MG tablet, Take 10 mg by mouth nightly, Disp: , Rfl:     VILAZODONE HCL 20 MG Tablet, 40 mg , Disp: , Rfl:     hydrOXYzine (ATARAX) 25 MG tablet, TAKE 1 TABLET BY MOUTH THREE TIMES DAILY AS NEEDED, Disp: , Rfl:     OMEPRAZOLE PO, Take by mouth, Disp: , Rfl:     glycopyrrolate (ROBINUL) 2 MG tablet, Take 2 mg by mouth 5 times daily 6 mg in the am and 4 mg in the pm. , Disp: , Rfl:   Patient has no known allergies. Social History     Tobacco Use    Smoking status: Former Smoker     Packs/day: 1.00     Types: Cigarettes     Quit date: 2021     Years since quittin.1    Smokeless tobacco: Never Used   Vaping Use    Vaping Use: Every day    Substances: Nicotine    Devices: Disposable   Substance Use Topics    Alcohol use: Yes     Comment: socially    Drug use: No     Types: IV     Family History   Problem Relation Age of Onset    Cancer Mother            Objective:   BP (!) 156/104 (Site: Right Upper Arm, Position: Sitting, Cuff Size: Medium Adult)   Pulse 113   Ht 5' 6\" (1.676 m)   Wt 236 lb (107 kg)   LMP 2021   BMI 38.09 kg/m²     Physical Exam  Vitals reviewed. Constitutional:       Appearance: Normal appearance. She is obese. HENT:      Head: Normocephalic. Jaw: Tenderness and pain on movement present. Right Ear: Ear canal and external ear normal. Tympanic membrane is retracted. Left Ear: Ear canal and external ear normal. Tympanic membrane is retracted. Nose: Nose normal. No congestion. Right Turbinates: Pale. Left Turbinates: Pale. Mouth/Throat:      Mouth: Mucous membranes are moist.      Pharynx: Oropharynx is clear. Uvula midline. Tonsils: 2+ on the right. 2+ on the left. Comments: Cryptic tonsils  Mild posterior cobblestoning   Eyes:      Conjunctiva/sclera: Conjunctivae normal.   Neck:      Trachea: Trachea and phonation normal.   Cardiovascular:      Rate and Rhythm: Normal rate. Pulses: Normal pulses. Pulmonary:      Effort: Pulmonary effort is normal.   Musculoskeletal:      Cervical back: Normal range of motion and neck supple. Lymphadenopathy:      Cervical: No cervical adenopathy. Skin:     Capillary Refill: Capillary refill takes less than 2 seconds. Neurological:      Mental Status: She is alert and oriented to person, place, and time. Assessment:       Diagnosis Orders   1. Arthritis of both temporomandibular joints  External Referral To Physical Therapy   2. Chronic tonsillitis     3. Tonsil stone     4. Non-seasonal allergic rhinitis due to other allergic trigger             Plan:        B/l TMJ   -discussed this is causing most of her symptoms, her ears look wnl no true infection hx as her symptoms described are consistent with TMJ not acute otitis media   -she has a custom bite guard, continue to wear this   -advised soft diet 6 weeks, warm compress, ibuprogen   -we will send her to physical therapy for further evaluation and treatment despite bite guard    Tonsil stones   -discussed option of tonsillectomy, patient would like to think about it at this time   -continue alcohol based mouth wash, probiotics     Allergic Rhinitis  -continue Flonase, Astelin, Singulair as this is assisting with controlling post nasal drip    Follow up in 3 months     Ervin Levy DO  Resident Physician  [facility]  Otolaryngology Residency  7/2/2021  2:49 PM    Electronically signed by Abi Loving DO on 7/2/21 at10:01 AM YAO Ramos  1995      I have discussed the case, including pertinent history and exam findings with the resident. I have seen and examined the patient and the key elements of the encounter have been performed by me. I agree with the assessment, plan and orders as documented by the resident. Patient here for follow up of medical problems. Remainder of medical problems as per resident note.       Blanka Armenta DO  7/27/21

## 2021-07-06 ENCOUNTER — TELEPHONE (OUTPATIENT)
Dept: ENT CLINIC | Age: 26
End: 2021-07-06

## 2021-07-06 NOTE — TELEPHONE ENCOUNTER
Patient states that Dr Kiara Boo advised her to call to be added to schedule if she ever felt like she had ear infection. [facility] advised that she does have bilateral ear infection. They prescribed Augmentin. She stated that she was advised to call in to be added to schedule for soonest appointment as Dr Kiara Boo told her he believes this is caused by her jaw/TMJ. Dr Kiara Boo not in office this week. No soon appointments open. Patient is requesting to be fit on Dr Doug Blunt schedule when he returns in office.

## 2021-07-06 NOTE — TELEPHONE ENCOUNTER
Spoke with patient she is requesting a script for ear drops.   Sent to [facility] in [place]    Please advise

## 2021-07-06 NOTE — TELEPHONE ENCOUNTER
Spoke with patient states she has an appt with her PCP on Friday.   States she will just see her pcp

## 2021-07-09 ENCOUNTER — HOSPITAL ENCOUNTER (EMERGENCY)
Age: 26
Discharge: HOME | End: 2021-07-09
Payer: COMMERCIAL

## 2021-07-09 VITALS
HEART RATE: 114 BPM | BODY MASS INDEX: 37.93 KG/M2 | WEIGHT: 236 LBS | TEMPERATURE: 97.2 F | RESPIRATION RATE: 16 BRPM | SYSTOLIC BLOOD PRESSURE: 167 MMHG | DIASTOLIC BLOOD PRESSURE: 94 MMHG | OXYGEN SATURATION: 98 % | HEIGHT: 66 IN

## 2021-07-09 DIAGNOSIS — L73.9 FOLLICULITIS: ICD-10-CM

## 2021-07-09 DIAGNOSIS — L72.3 SEBACEOUS CYST: Primary | ICD-10-CM

## 2021-07-09 PROCEDURE — 99282 EMERGENCY DEPT VISIT SF MDM: CPT

## 2021-07-09 RX ORDER — SULFAMETHOXAZOLE AND TRIMETHOPRIM 800; 160 MG/1; MG/1
1 TABLET ORAL 2 TIMES DAILY
Qty: 20 TABLET | Refills: 0 | Status: SHIPPED | OUTPATIENT
Start: 2021-07-09 | End: 2021-07-19

## 2021-07-09 RX ORDER — IBUPROFEN 600 MG/1
600 TABLET ORAL EVERY 6 HOURS PRN
Qty: 20 TABLET | Refills: 0 | Status: SHIPPED | OUTPATIENT
Start: 2021-07-09 | End: 2021-07-21 | Stop reason: ALTCHOICE

## 2021-07-09 ASSESSMENT — PAIN SCALES - GENERAL: PAINLEVEL_OUTOF10: 9

## 2021-07-09 ASSESSMENT — PAIN DESCRIPTION - PAIN TYPE: TYPE: ACUTE PAIN

## 2021-07-09 ASSESSMENT — PAIN DESCRIPTION - DESCRIPTORS: DESCRIPTORS: ACHING

## 2021-07-09 NOTE — ED PROVIDER NOTES
[facility]  Department of Emergency Medicine   ED  Encounter Note  Admit Date/RoomTime: 2021 12:02 PM  ED Room:   NAME: Arline Ramos  : 1995  MRN: [id number]     Chief Complaint:  Abscess (was seen by GYN for vaginal abscess and was put on antibiotic, off for a month and now having more. )    HISTORY OF PRESENT ILLNESS        Lola Crane is a 31 y.o. female who presents to the ED with a complaint of abscess in her vaginal area. Patient states in her pubic hair area she has a lump the size of a marble. It is very hard. Tender to the touch. States she can even feel it when she is walking or sitting. Patient does admit she has had these previously. States her family doctor told her to go see her gynecologist.  She called the gynecology office and he is delivering in the hospital all day today. She rates the pain a 9 out of 10. States Motrin is not helping. ROS   Pertinent positives and negatives are stated within HPI, all other systems reviewed and are negative. Past Medical History:  has a past medical history of Drug abuse ([address] Hyperhidrosis, and Hyperlipidemia. Surgical History:  has a past surgical history that includes  section and Abscess Drainage. Social History:  reports that she quit smoking about 2 months ago. Her smoking use included cigarettes. She smoked 1.00 pack per day. She has never used smokeless tobacco. She reports current alcohol use. She reports that she does not use drugs. Family History: family history includes Cancer in her mother. Allergies: Patient has no known allergies.     PHYSICAL EXAM   Oxygen Saturation Interpretation: Normal.        ED Triage Vitals   BP Temp Temp Source Pulse Resp SpO2 Height Weight   21 1148 21 1136 21 1136 21 1136 21 1136 21 1136 21 1148 21 1148   (!) 167/94 97.2 °F (36.2 °C) Tympanic 114 16 98 % 5' 6\" (1.676 m) 236 lb for a while. The cyst may or may not come to ahead. Regardless she is not to be squeezing the lump as that would probably make it worse. Plan of Care/Counseling:  Physician Assistant on duty reviewed today's visit with the patient in addition to providing specific details for the plan of care and counseling regarding the diagnosis and prognosis. Questions are answered at this time and are agreeable with the plan. ASSESSMENT     1. Sebaceous cyst New Problem   2. Folliculitis New Problem     PLAN   Discharged home. Patient condition is good    New Medications     New Prescriptions    IBUPROFEN (IBU) 600 MG TABLET    Take 1 tablet by mouth every 6 hours as needed for Pain    SULFAMETHOXAZOLE-TRIMETHOPRIM (BACTRIM DS) 800-160 MG PER TABLET    Take 1 tablet by mouth 2 times daily for 10 days     Electronically signed by EDUARDO Fischer   DD: 7/9/21  **This report was transcribed using voice recognition software. Every effort was made to ensure accuracy; however, inadvertent computerized transcription errors may be present.   END OF ED PROVIDER NOTE       Omi Fischer  07/09/21 1224

## 2021-07-14 ENCOUNTER — TELEPHONE (OUTPATIENT)
Dept: ENT CLINIC | Age: 26
End: 2021-07-14

## 2021-07-14 NOTE — TELEPHONE ENCOUNTER
Please advise if you need to see pt to discuss sx or if she can be scheduled and if so what procedure sx and length?

## 2021-07-14 NOTE — TELEPHONE ENCOUNTER
Patient called stated Dr. Christophe Richards told her when she wanted to set up a surgery date to just call the office, she has a date that she would like to set up, she can be reached at [phone].

## 2021-07-27 ENCOUNTER — TELEPHONE (OUTPATIENT)
Dept: ENT CLINIC | Age: 26
End: 2021-07-27

## 2021-07-27 NOTE — TELEPHONE ENCOUNTER
Received call from Jed at [facility]-- patient never got covid testing done and became upset when advising per protocol she will need to do a UDS and was cancelling her surgery.     Lm for pt to confirm she wanted to cancel advising to call if she wants to r/s

## 2021-07-27 NOTE — TELEPHONE ENCOUNTER
Pt called back and stated she is scheduled with someone in [place] so she will not be rescheduling

## 2023-06-27 ENCOUNTER — HOSPITAL ENCOUNTER (EMERGENCY)
Age: 28
Discharge: HOME | End: 2023-06-27
Attending: EMERGENCY MEDICINE
Payer: COMMERCIAL

## 2023-06-27 ENCOUNTER — APPOINTMENT (OUTPATIENT)
Dept: CT IMAGING | Age: 28
End: 2023-06-27
Payer: COMMERCIAL

## 2023-06-27 VITALS
TEMPERATURE: 97.4 F | SYSTOLIC BLOOD PRESSURE: 138 MMHG | RESPIRATION RATE: 18 BRPM | OXYGEN SATURATION: 100 % | WEIGHT: 210 LBS | BODY MASS INDEX: 33.75 KG/M2 | HEART RATE: 104 BPM | HEIGHT: 66 IN | DIASTOLIC BLOOD PRESSURE: 98 MMHG

## 2023-06-27 DIAGNOSIS — K59.00 CONSTIPATION, UNSPECIFIED CONSTIPATION TYPE: Primary | ICD-10-CM

## 2023-06-27 DIAGNOSIS — B00.1 COLD SORE: ICD-10-CM

## 2023-06-27 LAB
ALBUMIN SERPL-MCNC: 4.9 G/DL (ref 3.5–5.2)
ALP SERPL-CCNC: 89 U/L (ref 35–104)
ALT SERPL-CCNC: 20 U/L (ref 0–32)
ANION GAP SERPL CALCULATED.3IONS-SCNC: 13 MMOL/L (ref 7–16)
AST SERPL-CCNC: 24 U/L (ref 0–31)
BACTERIA URNS QL MICRO: ABNORMAL /HPF
BASOPHILS # BLD: 0.05 E9/L (ref 0–0.2)
BASOPHILS NFR BLD: 0.6 % (ref 0–2)
BILIRUB SERPL-MCNC: 0.8 MG/DL (ref 0–1.2)
BILIRUB UR QL STRIP: NEGATIVE
BUN SERPL-MCNC: 8 MG/DL (ref 6–20)
CALCIUM SERPL-MCNC: 9.6 MG/DL (ref 8.6–10.2)
CHLORIDE SERPL-SCNC: 98 MMOL/L (ref 98–107)
CLARITY UR: CLEAR
CO2 SERPL-SCNC: 25 MMOL/L (ref 22–29)
COLOR UR: YELLOW
CREAT SERPL-MCNC: 0.9 MG/DL (ref 0.5–1)
EOSINOPHIL # BLD: 0.1 E9/L (ref 0.05–0.5)
EOSINOPHIL NFR BLD: 1.3 % (ref 0–6)
EPI CELLS #/AREA URNS HPF: ABNORMAL /HPF
ERYTHROCYTE [DISTWIDTH] IN BLOOD BY AUTOMATED COUNT: 12.9 FL (ref 11.5–15)
GLUCOSE SERPL-MCNC: 94 MG/DL (ref 74–99)
GLUCOSE UR STRIP-MCNC: NEGATIVE MG/DL
HCG, URINE, POC: NEGATIVE
HCT VFR BLD AUTO: 41.6 % (ref 34–48)
HGB BLD-MCNC: 15.2 G/DL (ref 11.5–15.5)
HGB UR QL STRIP: NEGATIVE
HYALINE CASTS #/AREA URNS LPF: ABNORMAL /LPF (ref 0–2)
IMM GRANULOCYTES # BLD: 0.02 E9/L
IMM GRANULOCYTES NFR BLD: 0.3 % (ref 0–5)
KETONES UR STRIP-MCNC: ABNORMAL MG/DL
LACTATE BLDV-SCNC: 1.3 MMOL/L (ref 0.5–2.2)
LEUKOCYTE ESTERASE UR QL STRIP: NEGATIVE
LIPASE: 23 U/L (ref 13–60)
LYMPHOCYTES # BLD: 2.73 E9/L (ref 1.5–4)
LYMPHOCYTES NFR BLD: 34.8 % (ref 20–42)
Lab: NORMAL
MCH RBC QN AUTO: 32.3 PG (ref 26–35)
MCHC RBC AUTO-ENTMCNC: 36.5 % (ref 32–34.5)
MCV RBC AUTO: 88.5 FL (ref 80–99.9)
MONOCYTES # BLD: 0.6 E9/L (ref 0.1–0.95)
MONOCYTES NFR BLD: 7.6 % (ref 2–12)
NEGATIVE QC PASS/FAIL: NORMAL
NEUTROPHILS # BLD: 4.35 E9/L (ref 1.8–7.3)
NEUTS SEG NFR BLD: 55.4 % (ref 43–80)
NITRITE UR QL STRIP: NEGATIVE
PH UR STRIP: 6 [PH] (ref 5–9)
PLATELET # BLD AUTO: 382 E9/L (ref 130–450)
PMV BLD AUTO: 9.7 FL (ref 7–12)
POSITIVE QC PASS/FAIL: NORMAL
POTASSIUM SERPL-SCNC: 3.4 MMOL/L (ref 3.5–5)
PROT SERPL-MCNC: 7.9 G/DL (ref 6.4–8.3)
PROT UR STRIP-MCNC: NEGATIVE MG/DL
RBC # BLD AUTO: 4.7 E12/L (ref 3.5–5.5)
RBC #/AREA URNS HPF: ABNORMAL /HPF (ref 0–2)
SODIUM SERPL-SCNC: 136 MMOL/L (ref 132–146)
SP GR UR STRIP: 1.01 (ref 1–1.03)
UROBILINOGEN UR STRIP-ACNC: 1 E.U./DL
WBC # BLD: 7.9 E9/L (ref 4.5–11.5)
WBC #/AREA URNS HPF: ABNORMAL /HPF (ref 0–5)

## 2023-06-27 PROCEDURE — 96374 THER/PROPH/DIAG INJ IV PUSH: CPT

## 2023-06-27 PROCEDURE — 80053 COMPREHEN METABOLIC PANEL: CPT

## 2023-06-27 PROCEDURE — 83690 ASSAY OF LIPASE: CPT

## 2023-06-27 PROCEDURE — 83605 ASSAY OF LACTIC ACID: CPT

## 2023-06-27 PROCEDURE — 36415 COLL VENOUS BLD VENIPUNCTURE: CPT

## 2023-06-27 PROCEDURE — 6370000000 HC RX 637 (ALT 250 FOR IP)

## 2023-06-27 PROCEDURE — 6360000004 HC RX CONTRAST MEDICATION: Performed by: RADIOLOGY

## 2023-06-27 PROCEDURE — 81001 URINALYSIS AUTO W/SCOPE: CPT

## 2023-06-27 PROCEDURE — 2580000003 HC RX 258

## 2023-06-27 PROCEDURE — 96375 TX/PRO/DX INJ NEW DRUG ADDON: CPT

## 2023-06-27 PROCEDURE — 74177 CT ABD & PELVIS W/CONTRAST: CPT

## 2023-06-27 PROCEDURE — 6360000002 HC RX W HCPCS

## 2023-06-27 PROCEDURE — 85025 COMPLETE CBC W/AUTO DIFF WBC: CPT

## 2023-06-27 PROCEDURE — 99285 EMERGENCY DEPT VISIT HI MDM: CPT

## 2023-06-27 RX ORDER — 0.9 % SODIUM CHLORIDE 0.9 %
1000 INTRAVENOUS SOLUTION INTRAVENOUS ONCE
Status: COMPLETED | OUTPATIENT
Start: 2023-06-27 | End: 2023-06-27

## 2023-06-27 RX ORDER — LACTULOSE 10 G/15ML
20 SOLUTION ORAL ONCE
Status: COMPLETED | OUTPATIENT
Start: 2023-06-27 | End: 2023-06-27

## 2023-06-27 RX ORDER — ONDANSETRON 2 MG/ML
4 INJECTION INTRAMUSCULAR; INTRAVENOUS ONCE
Status: COMPLETED | OUTPATIENT
Start: 2023-06-27 | End: 2023-06-27

## 2023-06-27 RX ORDER — MORPHINE SULFATE 4 MG/ML
4 INJECTION, SOLUTION INTRAMUSCULAR; INTRAVENOUS ONCE
Status: COMPLETED | OUTPATIENT
Start: 2023-06-27 | End: 2023-06-27

## 2023-06-27 RX ORDER — SENNA AND DOCUSATE SODIUM 50; 8.6 MG/1; MG/1
1 TABLET, FILM COATED ORAL DAILY
Qty: 10 TABLET | Refills: 0 | Status: SHIPPED | OUTPATIENT
Start: 2023-06-27 | End: 2023-07-07

## 2023-06-27 RX ORDER — FENTANYL CITRATE 0.05 MG/ML
50 INJECTION, SOLUTION INTRAMUSCULAR; INTRAVENOUS ONCE
Status: COMPLETED | OUTPATIENT
Start: 2023-06-27 | End: 2023-06-27

## 2023-06-27 RX ORDER — VALACYCLOVIR HYDROCHLORIDE 1 G/1
1000 TABLET, FILM COATED ORAL 2 TIMES DAILY
Qty: 14 TABLET | Refills: 0 | Status: SHIPPED | OUTPATIENT
Start: 2023-06-27 | End: 2023-07-04

## 2023-06-27 RX ADMIN — FENTANYL CITRATE 50 MCG: 0.05 INJECTION, SOLUTION INTRAMUSCULAR; INTRAVENOUS at 08:21

## 2023-06-27 RX ADMIN — IOPAMIDOL 75 ML: 755 INJECTION, SOLUTION INTRAVENOUS at 09:10

## 2023-06-27 RX ADMIN — ONDANSETRON 4 MG: 2 INJECTION INTRAMUSCULAR; INTRAVENOUS at 08:21

## 2023-06-27 RX ADMIN — SODIUM CHLORIDE 1000 ML: 9 INJECTION, SOLUTION INTRAVENOUS at 08:21

## 2023-06-27 RX ADMIN — LACTULOSE 20 G: 20 SOLUTION ORAL at 09:56

## 2023-06-27 RX ADMIN — MORPHINE SULFATE 4 MG: 4 INJECTION, SOLUTION INTRAMUSCULAR; INTRAVENOUS at 09:56

## 2023-06-27 ASSESSMENT — PAIN SCALES - GENERAL
PAINLEVEL_OUTOF10: 10

## 2023-06-27 ASSESSMENT — PAIN - FUNCTIONAL ASSESSMENT: PAIN_FUNCTIONAL_ASSESSMENT: 0-10

## 2023-06-28 ASSESSMENT — ENCOUNTER SYMPTOMS
DIARRHEA: 0
CHEST TIGHTNESS: 0
RHINORRHEA: 0
ABDOMINAL DISTENTION: 1
NAUSEA: 1
CONSTIPATION: 1
SORE THROAT: 0
BLOOD IN STOOL: 0
VOMITING: 0
ABDOMINAL PAIN: 1
COUGH: 0
SHORTNESS OF BREATH: 0
BACK PAIN: 0
PHOTOPHOBIA: 0

## 2023-07-01 ENCOUNTER — HOSPITAL ENCOUNTER (EMERGENCY)
Age: 28
Discharge: LEFT BEFORE DISCHARGE | End: 2023-07-01
Payer: COMMERCIAL

## 2023-07-01 VITALS
DIASTOLIC BLOOD PRESSURE: 80 MMHG | HEART RATE: 102 BPM | OXYGEN SATURATION: 97 % | TEMPERATURE: 97.9 F | SYSTOLIC BLOOD PRESSURE: 140 MMHG | RESPIRATION RATE: 18 BRPM

## 2023-07-01 DIAGNOSIS — R10.9 ABDOMINAL PAIN, UNSPECIFIED ABDOMINAL LOCATION: Primary | ICD-10-CM

## 2023-07-01 LAB
ALBUMIN SERPL-MCNC: 4.1 G/DL (ref 3.5–5.2)
ALP SERPL-CCNC: 77 U/L (ref 35–104)
ALT SERPL-CCNC: 15 U/L (ref 0–32)
ANION GAP SERPL CALCULATED.3IONS-SCNC: 9 MMOL/L (ref 7–16)
AST SERPL-CCNC: 17 U/L (ref 0–31)
BACTERIA URNS QL MICRO: ABNORMAL /HPF
BASOPHILS # BLD: 0.03 E9/L (ref 0–0.2)
BASOPHILS NFR BLD: 0.4 % (ref 0–2)
BILIRUB SERPL-MCNC: 0.2 MG/DL (ref 0–1.2)
BILIRUB UR QL STRIP: NEGATIVE
BUN SERPL-MCNC: 14 MG/DL (ref 6–20)
CALCIUM SERPL-MCNC: 9.1 MG/DL (ref 8.6–10.2)
CHLORIDE SERPL-SCNC: 104 MMOL/L (ref 98–107)
CLARITY UR: CLEAR
CO2 SERPL-SCNC: 26 MMOL/L (ref 22–29)
COLOR UR: YELLOW
CREAT SERPL-MCNC: 0.9 MG/DL (ref 0.5–1)
EOSINOPHIL # BLD: 0.09 E9/L (ref 0.05–0.5)
EOSINOPHIL NFR BLD: 1.1 % (ref 0–6)
EPI CELLS #/AREA URNS HPF: ABNORMAL /HPF
ERYTHROCYTE [DISTWIDTH] IN BLOOD BY AUTOMATED COUNT: 13.1 FL (ref 11.5–15)
GLUCOSE SERPL-MCNC: 108 MG/DL (ref 74–99)
GLUCOSE UR STRIP-MCNC: NEGATIVE MG/DL
HCG, URINE, POC: NEGATIVE
HCT VFR BLD AUTO: 36.7 % (ref 34–48)
HGB BLD-MCNC: 12.8 G/DL (ref 11.5–15.5)
HGB UR QL STRIP: NEGATIVE
IMM GRANULOCYTES # BLD: 0.04 E9/L
IMM GRANULOCYTES NFR BLD: 0.5 % (ref 0–5)
KETONES UR STRIP-MCNC: NEGATIVE MG/DL
LACTATE BLDV-SCNC: 1.6 MMOL/L (ref 0.5–2.2)
LEUKOCYTE ESTERASE UR QL STRIP: NEGATIVE
LIPASE: 29 U/L (ref 13–60)
LYMPHOCYTES # BLD: 2.69 E9/L (ref 1.5–4)
LYMPHOCYTES NFR BLD: 32.3 % (ref 20–42)
Lab: NORMAL
MCH RBC QN AUTO: 31.9 PG (ref 26–35)
MCHC RBC AUTO-ENTMCNC: 34.9 % (ref 32–34.5)
MCV RBC AUTO: 91.5 FL (ref 80–99.9)
MONOCYTES # BLD: 0.49 E9/L (ref 0.1–0.95)
MONOCYTES NFR BLD: 5.9 % (ref 2–12)
NEGATIVE QC PASS/FAIL: NORMAL
NEUTROPHILS # BLD: 4.98 E9/L (ref 1.8–7.3)
NEUTS SEG NFR BLD: 59.8 % (ref 43–80)
NITRITE UR QL STRIP: NEGATIVE
PH UR STRIP: 6.5 [PH] (ref 5–9)
PLATELET # BLD AUTO: 323 E9/L (ref 130–450)
PMV BLD AUTO: 9.7 FL (ref 7–12)
POSITIVE QC PASS/FAIL: NORMAL
POTASSIUM SERPL-SCNC: 3.6 MMOL/L (ref 3.5–5)
PROT SERPL-MCNC: 6.9 G/DL (ref 6.4–8.3)
PROT UR STRIP-MCNC: NEGATIVE MG/DL
RBC # BLD AUTO: 4.01 E12/L (ref 3.5–5.5)
RBC #/AREA URNS HPF: ABNORMAL /HPF (ref 0–2)
SODIUM SERPL-SCNC: 139 MMOL/L (ref 132–146)
SP GR UR STRIP: <=1.005 (ref 1–1.03)
UROBILINOGEN UR STRIP-ACNC: 0.2 E.U./DL
WBC # BLD: 8.3 E9/L (ref 4.5–11.5)
WBC #/AREA URNS HPF: ABNORMAL /HPF (ref 0–5)

## 2023-07-01 PROCEDURE — 83605 ASSAY OF LACTIC ACID: CPT

## 2023-07-01 PROCEDURE — 80053 COMPREHEN METABOLIC PANEL: CPT

## 2023-07-01 PROCEDURE — 85025 COMPLETE CBC W/AUTO DIFF WBC: CPT

## 2023-07-01 PROCEDURE — 81001 URINALYSIS AUTO W/SCOPE: CPT

## 2023-07-01 PROCEDURE — 99283 EMERGENCY DEPT VISIT LOW MDM: CPT

## 2023-07-01 PROCEDURE — 83690 ASSAY OF LIPASE: CPT

## 2023-07-05 ENCOUNTER — OFFICE VISIT (OUTPATIENT)
Dept: GASTROENTEROLOGY | Age: 28
End: 2023-07-05
Payer: COMMERCIAL

## 2023-07-05 VITALS
RESPIRATION RATE: 16 BRPM | OXYGEN SATURATION: 97 % | DIASTOLIC BLOOD PRESSURE: 86 MMHG | BODY MASS INDEX: 36.67 KG/M2 | SYSTOLIC BLOOD PRESSURE: 139 MMHG | HEART RATE: 105 BPM | TEMPERATURE: 97.5 F | HEIGHT: 66 IN | WEIGHT: 228.2 LBS

## 2023-07-05 DIAGNOSIS — K59.00 CONSTIPATION, UNSPECIFIED CONSTIPATION TYPE: ICD-10-CM

## 2023-07-05 DIAGNOSIS — K59.00 CONSTIPATION, UNSPECIFIED CONSTIPATION TYPE: Primary | ICD-10-CM

## 2023-07-05 LAB
IGA SERPL-MCNC: 212 MG/DL (ref 70–400)
TSH SERPL-MCNC: 3.61 UIU/ML (ref 0.27–4.2)

## 2023-07-05 PROCEDURE — G8417 CALC BMI ABV UP PARAM F/U: HCPCS | Performed by: NURSE PRACTITIONER

## 2023-07-05 PROCEDURE — G8427 DOCREV CUR MEDS BY ELIG CLIN: HCPCS | Performed by: NURSE PRACTITIONER

## 2023-07-05 PROCEDURE — 99202 OFFICE O/P NEW SF 15 MIN: CPT | Performed by: NURSE PRACTITIONER

## 2023-07-05 PROCEDURE — 1036F TOBACCO NON-USER: CPT | Performed by: NURSE PRACTITIONER

## 2023-07-05 RX ORDER — POLYETHYLENE GLYCOL 3350, SODIUM CHLORIDE, POTASSIUM CHLORIDE, SODIUM BICARBONATE, AND SODIUM SULFATE 240; 5.84; 2.98; 6.72; 22.72 G/4L; G/4L; G/4L; G/4L; G/4L
4000 POWDER, FOR SOLUTION ORAL ONCE
Qty: 1 EACH | Refills: 0 | Status: SHIPPED | OUTPATIENT
Start: 2023-07-05 | End: 2023-07-05

## 2023-07-05 RX ORDER — DEXTROAMPHETAMINE SACCHARATE, AMPHETAMINE ASPARTATE, DEXTROAMPHETAMINE SULFATE AND AMPHETAMINE SULFATE 5; 5; 5; 5 MG/1; MG/1; MG/1; MG/1
1 TABLET ORAL 2 TIMES DAILY
COMMUNITY
Start: 2023-06-21

## 2023-07-05 RX ORDER — ESCITALOPRAM OXALATE 10 MG/1
10 TABLET ORAL DAILY
COMMUNITY
Start: 2023-06-13

## 2023-07-10 SDOH — HEALTH STABILITY: PHYSICAL HEALTH: ON AVERAGE, HOW MANY MINUTES DO YOU ENGAGE IN EXERCISE AT THIS LEVEL?: 90 MIN

## 2023-07-10 SDOH — HEALTH STABILITY: PHYSICAL HEALTH: ON AVERAGE, HOW MANY DAYS PER WEEK DO YOU ENGAGE IN MODERATE TO STRENUOUS EXERCISE (LIKE A BRISK WALK)?: 7 DAYS

## 2023-07-11 ENCOUNTER — OFFICE VISIT (OUTPATIENT)
Dept: FAMILY MEDICINE CLINIC | Age: 28
End: 2023-07-11
Payer: COMMERCIAL

## 2023-07-11 VITALS
BODY MASS INDEX: 36.96 KG/M2 | SYSTOLIC BLOOD PRESSURE: 120 MMHG | RESPIRATION RATE: 18 BRPM | OXYGEN SATURATION: 98 % | DIASTOLIC BLOOD PRESSURE: 84 MMHG | WEIGHT: 230 LBS | HEIGHT: 66 IN | HEART RATE: 111 BPM

## 2023-07-11 DIAGNOSIS — K59.00 CONSTIPATION, UNSPECIFIED CONSTIPATION TYPE: ICD-10-CM

## 2023-07-11 DIAGNOSIS — Z00.00 ENCOUNTER FOR MEDICAL EXAMINATION TO ESTABLISH CARE: Primary | ICD-10-CM

## 2023-07-11 DIAGNOSIS — R61 HYPERHIDROSIS: ICD-10-CM

## 2023-07-11 DIAGNOSIS — N83.201 CYST OF RIGHT OVARY: ICD-10-CM

## 2023-07-11 DIAGNOSIS — M54.50 CHRONIC BILATERAL LOW BACK PAIN, UNSPECIFIED WHETHER SCIATICA PRESENT: ICD-10-CM

## 2023-07-11 DIAGNOSIS — M54.9 MID BACK PAIN, CHRONIC: ICD-10-CM

## 2023-07-11 DIAGNOSIS — G89.29 MID BACK PAIN, CHRONIC: ICD-10-CM

## 2023-07-11 DIAGNOSIS — G89.29 CHRONIC BILATERAL LOW BACK PAIN, UNSPECIFIED WHETHER SCIATICA PRESENT: ICD-10-CM

## 2023-07-11 LAB — TTG IGA SER IA-ACNC: 0.9 U/ML

## 2023-07-11 PROCEDURE — G8427 DOCREV CUR MEDS BY ELIG CLIN: HCPCS | Performed by: NURSE PRACTITIONER

## 2023-07-11 PROCEDURE — 99204 OFFICE O/P NEW MOD 45 MIN: CPT | Performed by: NURSE PRACTITIONER

## 2023-07-11 PROCEDURE — G8417 CALC BMI ABV UP PARAM F/U: HCPCS | Performed by: NURSE PRACTITIONER

## 2023-07-11 PROCEDURE — 1036F TOBACCO NON-USER: CPT | Performed by: NURSE PRACTITIONER

## 2023-07-11 RX ORDER — IBUPROFEN 800 MG/1
800 TABLET ORAL EVERY 8 HOURS PRN
COMMUNITY
End: 2023-07-11 | Stop reason: SDUPTHER

## 2023-07-11 RX ORDER — GLYCOPYRROLATE 2 MG/1
TABLET ORAL
Qty: 150 TABLET | Refills: 1 | Status: SHIPPED | OUTPATIENT
Start: 2023-07-11

## 2023-07-11 RX ORDER — IBUPROFEN 800 MG/1
800 TABLET ORAL EVERY 8 HOURS PRN
Qty: 90 TABLET | Refills: 1 | Status: SHIPPED | OUTPATIENT
Start: 2023-07-11

## 2023-07-11 SDOH — ECONOMIC STABILITY: FOOD INSECURITY: WITHIN THE PAST 12 MONTHS, THE FOOD YOU BOUGHT JUST DIDN'T LAST AND YOU DIDN'T HAVE MONEY TO GET MORE.: NEVER TRUE

## 2023-07-11 SDOH — ECONOMIC STABILITY: INCOME INSECURITY: HOW HARD IS IT FOR YOU TO PAY FOR THE VERY BASICS LIKE FOOD, HOUSING, MEDICAL CARE, AND HEATING?: NOT HARD AT ALL

## 2023-07-11 SDOH — ECONOMIC STABILITY: HOUSING INSECURITY
IN THE LAST 12 MONTHS, WAS THERE A TIME WHEN YOU DID NOT HAVE A STEADY PLACE TO SLEEP OR SLEPT IN A SHELTER (INCLUDING NOW)?: NO

## 2023-07-11 SDOH — ECONOMIC STABILITY: FOOD INSECURITY: WITHIN THE PAST 12 MONTHS, YOU WORRIED THAT YOUR FOOD WOULD RUN OUT BEFORE YOU GOT MONEY TO BUY MORE.: NEVER TRUE

## 2023-07-11 ASSESSMENT — PATIENT HEALTH QUESTIONNAIRE - PHQ9
SUM OF ALL RESPONSES TO PHQ QUESTIONS 1-9: 0
SUM OF ALL RESPONSES TO PHQ9 QUESTIONS 1 & 2: 0
2. FEELING DOWN, DEPRESSED OR HOPELESS: 0
SUM OF ALL RESPONSES TO PHQ QUESTIONS 1-9: 0
1. LITTLE INTEREST OR PLEASURE IN DOING THINGS: 0

## 2023-07-11 ASSESSMENT — ENCOUNTER SYMPTOMS
BLOOD IN STOOL: 1
DIARRHEA: 0
BACK PAIN: 1
CHEST TIGHTNESS: 1
COUGH: 0
NAUSEA: 0
WHEEZING: 0
CONSTIPATION: 1
VOMITING: 0
SHORTNESS OF BREATH: 0
ABDOMINAL PAIN: 0

## 2023-07-11 NOTE — PROGRESS NOTES
Maria L Ramos (:  1995) is a 33 y.o. female,New patient, here for evaluation of the following chief complaint(s):  New Patient and Abdominal Pain (Recently in the ER-constipation has appt with Dr Timo Grande)         ASSESSMENT/PLAN:  1. Encounter for medical examination to establish care  2. Hyperhidrosis  -     glycopyrrolate (ROBINUL) 2 MG tablet; 6 mg in the am and 4 mg in the pm., Disp-150 tablet, R-1Normal  Stable on current dose   The current medical regimen is effective;  continue present plan and medications. 3. Chronic bilateral low back pain, unspecified whether sciatica present  -     ibuprofen (ADVIL;MOTRIN) 800 MG tablet; Take 1 tablet by mouth every 8 hours as needed for Pain, Disp-90 tablet, R-1Normal  Prn only  The current medical regimen is effective;  continue present plan and medications. 4. Mid back pain, chronic  -     ibuprofen (ADVIL;MOTRIN) 800 MG tablet; Take 1 tablet by mouth every 8 hours as needed for Pain, Disp-90 tablet, R-1Normal  Prn only  The current medical regimen is effective;  continue present plan and medications. 5. Constipation, unspecified constipation type  Has consult with Dr Timo Grande next week to get scheduled for colonoscopy    6. Cyst of right ovary  -will schedule follow up with Dr Flako Franco to be functional cyst on CT, if RLQ discomfort does not resolve will order pelvic ultrasound    No follow-ups on file. Subjective   SUBJECTIVE/OBJECTIVE:  Patient is here to establish care. Complains of constipation. Normally has a bowel movement a few times per week. She did have abdominal pain and went to [facility]. Did follow up with GI and is scheduled to have a colonoscopy. She is eating fiber one bars. Having bowel movements but does not feel like emptying completely. Did have bright red and dark blood in stools but that has resolved. Bleeding was prior to seeing GI.   She follows Dr Tati Jack in [place] for her mental health and a therapist

## 2023-07-18 PROBLEM — K59.00 CONSTIPATION: Status: ACTIVE | Noted: 2023-07-18

## 2023-09-05 DIAGNOSIS — M54.50 CHRONIC BILATERAL LOW BACK PAIN, UNSPECIFIED WHETHER SCIATICA PRESENT: ICD-10-CM

## 2023-09-05 DIAGNOSIS — G89.29 MID BACK PAIN, CHRONIC: ICD-10-CM

## 2023-09-05 DIAGNOSIS — G89.29 CHRONIC BILATERAL LOW BACK PAIN, UNSPECIFIED WHETHER SCIATICA PRESENT: ICD-10-CM

## 2023-09-05 DIAGNOSIS — M54.9 MID BACK PAIN, CHRONIC: ICD-10-CM

## 2023-09-05 DIAGNOSIS — R61 HYPERHIDROSIS: ICD-10-CM

## 2023-09-05 RX ORDER — IBUPROFEN 800 MG/1
800 TABLET ORAL EVERY 8 HOURS PRN
Qty: 90 TABLET | Refills: 1 | Status: SHIPPED | OUTPATIENT
Start: 2023-09-05

## 2023-09-05 RX ORDER — GLYCOPYRROLATE 2 MG/1
TABLET ORAL
Qty: 150 TABLET | Refills: 1 | Status: SHIPPED | OUTPATIENT
Start: 2023-09-05

## 2023-11-10 ENCOUNTER — OFFICE VISIT (OUTPATIENT)
Dept: FAMILY MEDICINE CLINIC | Age: 28
End: 2023-11-10
Payer: COMMERCIAL

## 2023-11-10 VITALS
OXYGEN SATURATION: 98 % | BODY MASS INDEX: 38.35 KG/M2 | HEIGHT: 66 IN | WEIGHT: 238.6 LBS | HEART RATE: 105 BPM | TEMPERATURE: 98 F | DIASTOLIC BLOOD PRESSURE: 76 MMHG | RESPIRATION RATE: 16 BRPM | SYSTOLIC BLOOD PRESSURE: 128 MMHG

## 2023-11-10 DIAGNOSIS — G89.29 MID BACK PAIN, CHRONIC: ICD-10-CM

## 2023-11-10 DIAGNOSIS — R61 HYPERHIDROSIS: ICD-10-CM

## 2023-11-10 DIAGNOSIS — L24.9 IRRITANT CONTACT DERMATITIS, UNSPECIFIED TRIGGER: Primary | ICD-10-CM

## 2023-11-10 DIAGNOSIS — G89.29 CHRONIC BILATERAL LOW BACK PAIN, UNSPECIFIED WHETHER SCIATICA PRESENT: ICD-10-CM

## 2023-11-10 DIAGNOSIS — M54.50 CHRONIC BILATERAL LOW BACK PAIN, UNSPECIFIED WHETHER SCIATICA PRESENT: ICD-10-CM

## 2023-11-10 DIAGNOSIS — M54.9 MID BACK PAIN, CHRONIC: ICD-10-CM

## 2023-11-10 PROCEDURE — G8427 DOCREV CUR MEDS BY ELIG CLIN: HCPCS | Performed by: NURSE PRACTITIONER

## 2023-11-10 PROCEDURE — 1036F TOBACCO NON-USER: CPT | Performed by: NURSE PRACTITIONER

## 2023-11-10 PROCEDURE — G8484 FLU IMMUNIZE NO ADMIN: HCPCS | Performed by: NURSE PRACTITIONER

## 2023-11-10 PROCEDURE — 99214 OFFICE O/P EST MOD 30 MIN: CPT | Performed by: NURSE PRACTITIONER

## 2023-11-10 PROCEDURE — G8417 CALC BMI ABV UP PARAM F/U: HCPCS | Performed by: NURSE PRACTITIONER

## 2023-11-10 RX ORDER — ALPRAZOLAM 1 MG/1
1 TABLET ORAL 2 TIMES DAILY PRN
COMMUNITY
Start: 2023-10-18

## 2023-11-10 RX ORDER — IBUPROFEN 800 MG/1
800 TABLET ORAL EVERY 8 HOURS PRN
Qty: 90 TABLET | Refills: 1 | Status: SHIPPED | OUTPATIENT
Start: 2023-11-10

## 2023-11-10 RX ORDER — CHLORHEXIDINE GLUCONATE ORAL RINSE 1.2 MG/ML
SOLUTION DENTAL
COMMUNITY
Start: 2023-11-06

## 2023-11-10 RX ORDER — TRIAMCINOLONE ACETONIDE 1 MG/G
CREAM TOPICAL
Qty: 60 G | Refills: 1 | Status: SHIPPED | OUTPATIENT
Start: 2023-11-10

## 2023-11-10 RX ORDER — GLYCOPYRROLATE 2 MG/1
TABLET ORAL
Qty: 150 TABLET | Refills: 1 | Status: SHIPPED | OUTPATIENT
Start: 2023-11-10

## 2023-11-10 RX ORDER — HYDROXYZINE HYDROCHLORIDE 25 MG/1
25 TABLET, FILM COATED ORAL 3 TIMES DAILY PRN
COMMUNITY
Start: 2023-10-18

## 2023-11-10 ASSESSMENT — ENCOUNTER SYMPTOMS
VOMITING: 0
CONSTIPATION: 0
NAUSEA: 0
WHEEZING: 0
DIARRHEA: 0
COUGH: 0
SHORTNESS OF BREATH: 0

## 2024-01-11 DIAGNOSIS — G89.29 MID BACK PAIN, CHRONIC: ICD-10-CM

## 2024-01-11 DIAGNOSIS — G89.29 CHRONIC BILATERAL LOW BACK PAIN, UNSPECIFIED WHETHER SCIATICA PRESENT: ICD-10-CM

## 2024-01-11 DIAGNOSIS — R61 HYPERHIDROSIS: ICD-10-CM

## 2024-01-11 DIAGNOSIS — M54.9 MID BACK PAIN, CHRONIC: ICD-10-CM

## 2024-01-11 DIAGNOSIS — M54.50 CHRONIC BILATERAL LOW BACK PAIN, UNSPECIFIED WHETHER SCIATICA PRESENT: ICD-10-CM

## 2024-01-11 RX ORDER — IBUPROFEN 800 MG/1
800 TABLET ORAL EVERY 8 HOURS PRN
Qty: 90 TABLET | Refills: 1 | Status: SHIPPED | OUTPATIENT
Start: 2024-01-11

## 2024-01-11 RX ORDER — GLYCOPYRROLATE 2 MG/1
TABLET ORAL
Qty: 150 TABLET | Refills: 1 | Status: SHIPPED | OUTPATIENT
Start: 2024-01-11

## 2024-01-11 NOTE — TELEPHONE ENCOUNTER
Requested Prescriptions     Pending Prescriptions Disp Refills    glycopyrrolate (ROBINUL) 2 MG tablet 150 tablet 1     Si mg in the am and 4 mg in the pm.    ibuprofen (ADVIL;MOTRIN) 800 MG tablet 90 tablet 1     Sig: Take 1 tablet by mouth every 8 hours as needed for Pain       Next appt is Visit date not found  Last appt was 11/10/2023

## 2024-02-21 ENCOUNTER — E-VISIT (OUTPATIENT)
Dept: PRIMARY CARE CLINIC | Age: 29
End: 2024-02-21
Payer: COMMERCIAL

## 2024-02-21 DIAGNOSIS — G89.29 MID BACK PAIN, CHRONIC: ICD-10-CM

## 2024-02-21 DIAGNOSIS — R51.9 NONINTRACTABLE HEADACHE, UNSPECIFIED CHRONICITY PATTERN, UNSPECIFIED HEADACHE TYPE: Primary | ICD-10-CM

## 2024-02-21 DIAGNOSIS — R61 HYPERHIDROSIS: ICD-10-CM

## 2024-02-21 DIAGNOSIS — G89.29 CHRONIC BILATERAL LOW BACK PAIN, UNSPECIFIED WHETHER SCIATICA PRESENT: ICD-10-CM

## 2024-02-21 DIAGNOSIS — M54.9 MID BACK PAIN, CHRONIC: ICD-10-CM

## 2024-02-21 DIAGNOSIS — M54.50 CHRONIC BILATERAL LOW BACK PAIN, UNSPECIFIED WHETHER SCIATICA PRESENT: ICD-10-CM

## 2024-02-21 PROCEDURE — 99422 OL DIG E/M SVC 11-20 MIN: CPT | Performed by: NURSE PRACTITIONER

## 2024-02-21 RX ORDER — IBUPROFEN 800 MG/1
800 TABLET ORAL EVERY 8 HOURS PRN
Qty: 90 TABLET | Refills: 1 | Status: SHIPPED | OUTPATIENT
Start: 2024-02-21

## 2024-02-21 RX ORDER — BUTALBITAL, ACETAMINOPHEN AND CAFFEINE 50; 325; 40 MG/1; MG/1; MG/1
1 TABLET ORAL EVERY 4 HOURS PRN
Qty: 30 TABLET | Refills: 0 | Status: SHIPPED | OUTPATIENT
Start: 2024-02-21

## 2024-02-21 RX ORDER — GLYCOPYRROLATE 2 MG/1
TABLET ORAL
Qty: 150 TABLET | Refills: 1 | Status: SHIPPED | OUTPATIENT
Start: 2024-02-21

## 2024-02-21 NOTE — PROGRESS NOTES
Reviewed questionnaire     Reviewed meds/allergies    Dx Headache    Plan Rx given for fioricet, follow up if no improvement    Time spent on visit 11 min

## 2024-02-23 ENCOUNTER — OFFICE VISIT (OUTPATIENT)
Dept: FAMILY MEDICINE CLINIC | Age: 29
End: 2024-02-23
Payer: COMMERCIAL

## 2024-02-23 VITALS
HEART RATE: 110 BPM | BODY MASS INDEX: 38.53 KG/M2 | DIASTOLIC BLOOD PRESSURE: 78 MMHG | HEIGHT: 66 IN | OXYGEN SATURATION: 98 % | TEMPERATURE: 97.4 F | SYSTOLIC BLOOD PRESSURE: 130 MMHG | RESPIRATION RATE: 18 BRPM

## 2024-02-23 DIAGNOSIS — R50.9 FEVER, UNSPECIFIED FEVER CAUSE: Primary | ICD-10-CM

## 2024-02-23 DIAGNOSIS — B96.89 ACUTE BACTERIAL SINUSITIS: ICD-10-CM

## 2024-02-23 DIAGNOSIS — J01.90 ACUTE BACTERIAL SINUSITIS: ICD-10-CM

## 2024-02-23 DIAGNOSIS — R05.1 ACUTE COUGH: ICD-10-CM

## 2024-02-23 LAB
INFLUENZA A ANTIGEN, POC: NEGATIVE
INFLUENZA B ANTIGEN, POC: NEGATIVE
LOT EXPIRE DATE: NORMAL
LOT KIT NUMBER: NORMAL
S PYO AG THROAT QL: NORMAL
SARS-COV-2, POC: NORMAL
VALID INTERNAL CONTROL: NORMAL
VENDOR AND KIT NAME POC: NORMAL

## 2024-02-23 PROCEDURE — 1036F TOBACCO NON-USER: CPT | Performed by: NURSE PRACTITIONER

## 2024-02-23 PROCEDURE — G8484 FLU IMMUNIZE NO ADMIN: HCPCS | Performed by: NURSE PRACTITIONER

## 2024-02-23 PROCEDURE — 99213 OFFICE O/P EST LOW 20 MIN: CPT | Performed by: NURSE PRACTITIONER

## 2024-02-23 PROCEDURE — G8417 CALC BMI ABV UP PARAM F/U: HCPCS | Performed by: NURSE PRACTITIONER

## 2024-02-23 PROCEDURE — 87428 SARSCOV & INF VIR A&B AG IA: CPT | Performed by: NURSE PRACTITIONER

## 2024-02-23 PROCEDURE — G8427 DOCREV CUR MEDS BY ELIG CLIN: HCPCS | Performed by: NURSE PRACTITIONER

## 2024-02-23 PROCEDURE — 87880 STREP A ASSAY W/OPTIC: CPT | Performed by: NURSE PRACTITIONER

## 2024-02-23 RX ORDER — AMOXICILLIN 875 MG/1
875 TABLET, COATED ORAL 2 TIMES DAILY
Qty: 20 TABLET | Refills: 0 | Status: SHIPPED | OUTPATIENT
Start: 2024-02-23 | End: 2024-03-04

## 2024-02-23 RX ORDER — FLUCONAZOLE 150 MG/1
150 TABLET ORAL ONCE
Qty: 1 TABLET | Refills: 1 | Status: SHIPPED | OUTPATIENT
Start: 2024-02-23 | End: 2024-02-23

## 2024-02-23 RX ORDER — DEXTROMETHORPHAN HYDROBROMIDE AND PROMETHAZINE HYDROCHLORIDE 15; 6.25 MG/5ML; MG/5ML
5 SYRUP ORAL 4 TIMES DAILY PRN
Qty: 120 ML | Refills: 0 | Status: SHIPPED | OUTPATIENT
Start: 2024-02-23 | End: 2024-03-04

## 2024-02-23 ASSESSMENT — ENCOUNTER SYMPTOMS
TROUBLE SWALLOWING: 1
COUGH: 1
CHEST TIGHTNESS: 1
VOMITING: 0
SINUS PRESSURE: 1
DIARRHEA: 0
SHORTNESS OF BREATH: 0
SINUS PAIN: 1
CONSTIPATION: 0
SORE THROAT: 1
WHEEZING: 1
NAUSEA: 1

## 2024-02-23 ASSESSMENT — PATIENT HEALTH QUESTIONNAIRE - PHQ9
1. LITTLE INTEREST OR PLEASURE IN DOING THINGS: 0
SUM OF ALL RESPONSES TO PHQ9 QUESTIONS 1 & 2: 0
SUM OF ALL RESPONSES TO PHQ QUESTIONS 1-9: 0
2. FEELING DOWN, DEPRESSED OR HOPELESS: 0

## 2024-02-29 DIAGNOSIS — R05.1 ACUTE COUGH: ICD-10-CM

## 2024-03-01 RX ORDER — DEXTROMETHORPHAN HYDROBROMIDE AND PROMETHAZINE HYDROCHLORIDE 15; 6.25 MG/5ML; MG/5ML
5 SYRUP ORAL 4 TIMES DAILY PRN
Qty: 120 ML | Refills: 0 | Status: SHIPPED | OUTPATIENT
Start: 2024-03-01 | End: 2024-03-11

## 2024-04-02 DIAGNOSIS — G89.29 MID BACK PAIN, CHRONIC: ICD-10-CM

## 2024-04-02 DIAGNOSIS — G89.29 CHRONIC BILATERAL LOW BACK PAIN, UNSPECIFIED WHETHER SCIATICA PRESENT: ICD-10-CM

## 2024-04-02 DIAGNOSIS — M54.9 MID BACK PAIN, CHRONIC: ICD-10-CM

## 2024-04-02 DIAGNOSIS — M54.50 CHRONIC BILATERAL LOW BACK PAIN, UNSPECIFIED WHETHER SCIATICA PRESENT: ICD-10-CM

## 2024-04-02 DIAGNOSIS — R61 HYPERHIDROSIS: ICD-10-CM

## 2024-04-02 RX ORDER — GLYCOPYRROLATE 2 MG/1
TABLET ORAL
Qty: 150 TABLET | Refills: 1 | Status: SHIPPED | OUTPATIENT
Start: 2024-04-02

## 2024-04-02 RX ORDER — IBUPROFEN 800 MG/1
800 TABLET ORAL EVERY 8 HOURS PRN
Qty: 90 TABLET | Refills: 1 | Status: SHIPPED | OUTPATIENT
Start: 2024-04-02

## 2024-05-09 ENCOUNTER — E-VISIT (OUTPATIENT)
Dept: PRIMARY CARE CLINIC | Age: 29
End: 2024-05-09
Payer: COMMERCIAL

## 2024-05-09 DIAGNOSIS — R61 HYPERHIDROSIS: ICD-10-CM

## 2024-05-09 DIAGNOSIS — R30.0 DYSURIA: Primary | ICD-10-CM

## 2024-05-09 DIAGNOSIS — M54.50 CHRONIC BILATERAL LOW BACK PAIN, UNSPECIFIED WHETHER SCIATICA PRESENT: ICD-10-CM

## 2024-05-09 DIAGNOSIS — M54.9 MID BACK PAIN, CHRONIC: ICD-10-CM

## 2024-05-09 DIAGNOSIS — G89.29 CHRONIC BILATERAL LOW BACK PAIN, UNSPECIFIED WHETHER SCIATICA PRESENT: ICD-10-CM

## 2024-05-09 DIAGNOSIS — G89.29 MID BACK PAIN, CHRONIC: ICD-10-CM

## 2024-05-09 PROCEDURE — 99422 OL DIG E/M SVC 11-20 MIN: CPT | Performed by: NURSE PRACTITIONER

## 2024-05-09 RX ORDER — NITROFURANTOIN 25; 75 MG/1; MG/1
100 CAPSULE ORAL 2 TIMES DAILY
Qty: 10 CAPSULE | Refills: 0 | Status: SHIPPED | OUTPATIENT
Start: 2024-05-09 | End: 2024-05-14

## 2024-05-09 RX ORDER — GLYCOPYRROLATE 2 MG/1
TABLET ORAL
Qty: 150 TABLET | Refills: 1 | Status: SHIPPED | OUTPATIENT
Start: 2024-05-09

## 2024-05-09 RX ORDER — IBUPROFEN 800 MG/1
800 TABLET ORAL EVERY 8 HOURS PRN
Qty: 90 TABLET | Refills: 0 | Status: SHIPPED | OUTPATIENT
Start: 2024-05-09

## 2024-05-09 NOTE — PROGRESS NOTES
Maria L Ramos (1995) initiated an asynchronous digital communication through [facility].    HPI: per patient questionnaire     Exam: not applicable    Diagnoses and all orders for this visit:  Diagnoses and all orders for this visit:    Dysuria    Other orders  -     nitrofurantoin, macrocrystal-monohydrate, (MACROBID) 100 MG capsule; Take 1 capsule by mouth 2 times daily for 5 days    Antibiotic sent.  Increase fluids.  Tylenol, Motrin or Azo for pain.  Follow-up PCP as needed    Time: EV2 - 11-20 minutes were spent on the digital evaluation and management of this patient. 16 min     KIRAN Silva - CNP

## 2024-05-09 NOTE — TELEPHONE ENCOUNTER
Requested Prescriptions     Pending Prescriptions Disp Refills    glycopyrrolate (ROBINUL) 2 MG tablet 150 tablet 1     Si mg in the am and 4 mg in the pm.    ibuprofen (ADVIL;MOTRIN) 800 MG tablet 90 tablet 0     Sig: Take 1 tablet by mouth every 8 hours as needed for Pain       Next appt is Visit date not found  Last appt was 2024

## 2024-06-10 ENCOUNTER — HOSPITAL ENCOUNTER (EMERGENCY)
Age: 29
Discharge: HOME | End: 2024-06-10
Attending: EMERGENCY MEDICINE
Payer: COMMERCIAL

## 2024-06-10 ENCOUNTER — APPOINTMENT (OUTPATIENT)
Dept: CT IMAGING | Age: 29
End: 2024-06-10
Payer: COMMERCIAL

## 2024-06-10 VITALS
BODY MASS INDEX: 38.43 KG/M2 | HEART RATE: 91 BPM | DIASTOLIC BLOOD PRESSURE: 112 MMHG | OXYGEN SATURATION: 100 % | WEIGHT: 238 LBS | RESPIRATION RATE: 18 BRPM | TEMPERATURE: 98.1 F | SYSTOLIC BLOOD PRESSURE: 157 MMHG

## 2024-06-10 DIAGNOSIS — R10.10 PAIN OF UPPER ABDOMEN: Primary | ICD-10-CM

## 2024-06-10 DIAGNOSIS — K76.0 HEPATIC STEATOSIS: ICD-10-CM

## 2024-06-10 DIAGNOSIS — I10 HYPERTENSION, UNSPECIFIED TYPE: ICD-10-CM

## 2024-06-10 LAB
ALBUMIN SERPL-MCNC: 4.1 G/DL (ref 3.5–5.2)
ALP SERPL-CCNC: 96 U/L (ref 35–104)
ALT SERPL-CCNC: 22 U/L (ref 0–32)
ANION GAP SERPL CALCULATED.3IONS-SCNC: 10 MMOL/L (ref 7–16)
AST SERPL-CCNC: 24 U/L (ref 0–31)
BACTERIA URNS QL MICRO: ABNORMAL
BASOPHILS # BLD: 0.06 K/UL (ref 0–0.2)
BASOPHILS NFR BLD: 1 % (ref 0–2)
BILIRUB SERPL-MCNC: 0.3 MG/DL (ref 0–1.2)
BILIRUB UR QL STRIP: NEGATIVE
BUN SERPL-MCNC: 17 MG/DL (ref 6–20)
CALCIUM SERPL-MCNC: 9.2 MG/DL (ref 8.6–10.2)
CHLORIDE SERPL-SCNC: 102 MMOL/L (ref 98–107)
CLARITY UR: CLEAR
CO2 SERPL-SCNC: 23 MMOL/L (ref 22–29)
COLOR UR: YELLOW
CREAT SERPL-MCNC: 0.9 MG/DL (ref 0.5–1)
EOSINOPHIL # BLD: 0.1 K/UL (ref 0.05–0.5)
EOSINOPHILS RELATIVE PERCENT: 1 % (ref 0–6)
ERYTHROCYTE [DISTWIDTH] IN BLOOD BY AUTOMATED COUNT: 12.7 % (ref 11.5–15)
GFR, ESTIMATED: >90 ML/MIN/1.73M2
GLUCOSE SERPL-MCNC: 99 MG/DL (ref 74–99)
GLUCOSE UR STRIP-MCNC: NEGATIVE MG/DL
HCG, URINE, POC: NEGATIVE
HCT VFR BLD AUTO: 42.5 % (ref 34–48)
HGB BLD-MCNC: 14.4 G/DL (ref 11.5–15.5)
HGB UR QL STRIP.AUTO: NEGATIVE
IMM GRANULOCYTES # BLD AUTO: 0.03 K/UL (ref 0–0.58)
IMM GRANULOCYTES NFR BLD: 0 % (ref 0–5)
KETONES UR STRIP-MCNC: NEGATIVE MG/DL
LACTATE BLDV-SCNC: 0.9 MMOL/L (ref 0.5–2.2)
LEUKOCYTE ESTERASE UR QL STRIP: NEGATIVE
LIPASE SERPL-CCNC: 24 U/L (ref 13–60)
LYMPHOCYTES NFR BLD: 2.36 K/UL (ref 1.5–4)
LYMPHOCYTES RELATIVE PERCENT: 31 % (ref 20–42)
Lab: NORMAL
MCH RBC QN AUTO: 32 PG (ref 26–35)
MCHC RBC AUTO-ENTMCNC: 33.9 G/DL (ref 32–34.5)
MCV RBC AUTO: 94.4 FL (ref 80–99.9)
MONOCYTES NFR BLD: 0.63 K/UL (ref 0.1–0.95)
MONOCYTES NFR BLD: 8 % (ref 2–12)
NEGATIVE QC PASS/FAIL: NORMAL
NEUTROPHILS NFR BLD: 59 % (ref 43–80)
NEUTS SEG NFR BLD: 4.48 K/UL (ref 1.8–7.3)
NITRITE UR QL STRIP: NEGATIVE
PH UR STRIP: 6 [PH] (ref 5–9)
PLATELET # BLD AUTO: 378 K/UL (ref 130–450)
PMV BLD AUTO: 9.3 FL (ref 7–12)
POSITIVE QC PASS/FAIL: NORMAL
POTASSIUM SERPL-SCNC: 4.2 MMOL/L (ref 3.5–5)
PROT SERPL-MCNC: 7.4 G/DL (ref 6.4–8.3)
PROT UR STRIP-MCNC: NEGATIVE MG/DL
RBC # BLD AUTO: 4.5 M/UL (ref 3.5–5.5)
RBC #/AREA URNS HPF: ABNORMAL /HPF
SODIUM SERPL-SCNC: 135 MMOL/L (ref 132–146)
SP GR UR STRIP: 1.02 (ref 1–1.03)
UROBILINOGEN UR STRIP-ACNC: 0.2 EU/DL (ref 0–1)
WBC #/AREA URNS HPF: ABNORMAL /HPF
WBC OTHER # BLD: 7.7 K/UL (ref 4.5–11.5)

## 2024-06-10 PROCEDURE — 83605 ASSAY OF LACTIC ACID: CPT

## 2024-06-10 PROCEDURE — 80053 COMPREHEN METABOLIC PANEL: CPT

## 2024-06-10 PROCEDURE — 6360000004 HC RX CONTRAST MEDICATION: Performed by: RADIOLOGY

## 2024-06-10 PROCEDURE — 83690 ASSAY OF LIPASE: CPT

## 2024-06-10 PROCEDURE — 6360000002 HC RX W HCPCS: Performed by: STUDENT IN AN ORGANIZED HEALTH CARE EDUCATION/TRAINING PROGRAM

## 2024-06-10 PROCEDURE — 81001 URINALYSIS AUTO W/SCOPE: CPT

## 2024-06-10 PROCEDURE — 85025 COMPLETE CBC W/AUTO DIFF WBC: CPT

## 2024-06-10 PROCEDURE — 74177 CT ABD & PELVIS W/CONTRAST: CPT

## 2024-06-10 PROCEDURE — 99285 EMERGENCY DEPT VISIT HI MDM: CPT

## 2024-06-10 PROCEDURE — 2580000003 HC RX 258: Performed by: STUDENT IN AN ORGANIZED HEALTH CARE EDUCATION/TRAINING PROGRAM

## 2024-06-10 PROCEDURE — 96374 THER/PROPH/DIAG INJ IV PUSH: CPT

## 2024-06-10 RX ORDER — DICYCLOMINE HYDROCHLORIDE 10 MG/1
10 CAPSULE ORAL
Qty: 40 CAPSULE | Refills: 0 | Status: SHIPPED | OUTPATIENT
Start: 2024-06-10 | End: 2024-06-20

## 2024-06-10 RX ORDER — KETOROLAC TROMETHAMINE 15 MG/ML
15 INJECTION, SOLUTION INTRAMUSCULAR; INTRAVENOUS ONCE
Status: COMPLETED | OUTPATIENT
Start: 2024-06-10 | End: 2024-06-10

## 2024-06-10 RX ORDER — FAMOTIDINE 20 MG/1
20 TABLET, FILM COATED ORAL 2 TIMES DAILY
Qty: 28 TABLET | Refills: 0 | Status: SHIPPED | OUTPATIENT
Start: 2024-06-10 | End: 2024-06-24

## 2024-06-10 RX ORDER — 0.9 % SODIUM CHLORIDE 0.9 %
1000 INTRAVENOUS SOLUTION INTRAVENOUS ONCE
Status: COMPLETED | OUTPATIENT
Start: 2024-06-10 | End: 2024-06-10

## 2024-06-10 RX ADMIN — SODIUM CHLORIDE 1000 ML: 9 INJECTION, SOLUTION INTRAVENOUS at 10:54

## 2024-06-10 RX ADMIN — KETOROLAC TROMETHAMINE 15 MG: 15 INJECTION, SOLUTION INTRAMUSCULAR; INTRAVENOUS at 10:56

## 2024-06-10 RX ADMIN — IOPAMIDOL 75 ML: 755 INJECTION, SOLUTION INTRAVENOUS at 12:14

## 2024-06-10 ASSESSMENT — PAIN DESCRIPTION - DESCRIPTORS
DESCRIPTORS: STABBING;CRUSHING
DESCRIPTORS: TIGHTNESS;POUNDING
DESCRIPTORS: STABBING

## 2024-06-10 ASSESSMENT — PAIN SCALES - GENERAL
PAINLEVEL_OUTOF10: 6
PAINLEVEL_OUTOF10: 6

## 2024-06-10 ASSESSMENT — PAIN - FUNCTIONAL ASSESSMENT
PAIN_FUNCTIONAL_ASSESSMENT: PREVENTS OR INTERFERES WITH ALL ACTIVE AND SOME PASSIVE ACTIVITIES
PAIN_FUNCTIONAL_ASSESSMENT: 0-10

## 2024-06-10 ASSESSMENT — PAIN DESCRIPTION - FREQUENCY: FREQUENCY: INTERMITTENT

## 2024-06-10 ASSESSMENT — PAIN DESCRIPTION - LOCATION
LOCATION: ABDOMEN;FLANK
LOCATION: ABDOMEN
LOCATION: ABDOMEN;FLANK

## 2024-06-10 ASSESSMENT — PAIN DESCRIPTION - ORIENTATION
ORIENTATION: LEFT

## 2024-06-10 NOTE — ED TRIAGE NOTES
abdomen pain x 3 weeks- worsened and now has bloating  increased gas, denies urinary s/s  left flank pain when started

## 2024-06-10 NOTE — DISCHARGE INSTRUCTIONS
Please follow-up with your [facility] care doctor.  Please return the hospital for any worsening symptoms.    Please stay well-hydrated.  Please decrease ibuprofen use as this can cause stomach upset and ulcers when used over long periods of time.    Please follow-up with your primary care doctor on your blood pressure.

## 2024-06-10 NOTE — ED PROVIDER NOTES
Name: Maria L Ramos    MRN: [id number]     Date / Time Roomed:  6/10/2024  9:34 AM  ED Bed Assignment:      ------------------ History of Present Illness --------------------  6/10/24, Time: 9:59 AM EDT   Chief Complaint   Patient presents with    Abdominal Pain     abdomen pain x 3 weeks- worsened and now has bloating  increased gas, denies urinary s/s  left flank pain when started         HPI    Maria L Ramos is a 29 y.o. female, with hx of anxiety, depression, hyperlipidemia, PTSD, who presents to the ED today for left flank pain, upper abdominal pain over the past 2 to 3 weeks.  She relates pain initially started the left lower back and left flank and is now mostly in the left upper quadrant and epigastric region.  She states she has had a lot of gas over the past week or 2.  She relates some abdominal fullness.  She relates having some diarrhea.  Symptoms are moderate in severity, no exacerbating leaving factors, she has not take anything for this.  No blood in the stool.  Denies any nausea or vomiting.  Has been tolerating p.o. normally.  Denies any fevers.  Denies urinary complaints.  Denies any vaginal bleeding or discharge.  She denies any leg swelling or pain.  Denies any chest pain or shortness of breath. The pt denies other ROS at this time.     PCP: Dior Florentino APRN - [facility].    -------------------- [facility] --------------------    Past Medical History:   has a past medical history of Anxiety, Depression, Drug abuse (HCC), Hyperhidrosis, Hyperlipidemia, and PTSD (post-traumatic stress disorder).     Surgical History:  Past Surgical History:   Procedure Laterality Date    ABSCESS DRAINAGE       SECTION         Social History:  Social History     Tobacco Use    Smoking status: Former     Current packs/day: 0.00     Types: Cigarettes     Start date: 2014     Quit date: 2021     Years since quitting: 3.1    Smokeless tobacco: Never   Vaping Use    Vaping Use: Every day

## 2024-06-10 NOTE — ED TRIAGE NOTES
FIRST PROVIDER CONTACT ASSESSMENT NOTE       Department of Emergency Medicine                 First Provider Note            6/10/24  9:30 AM EDT    Date of Encounter: No admission date for patient encounter.    Patient Name: Maria L Ramos  : 1995  MRN: [id number]    Chief Complaint: Abdominal Pain (abdomen pain x 3 weeks- worsened and now has bloating/increased gas, denies urinary s/s/left flank pain when started /)      History of Present Illness:   Maria L Ramos is a 29 y.o. female who presents to the ED for 3 week history abdominal pain that started in left flank and wraps around to RUQ.  Patients abdomen feels hard and her BM have changed.  She is having 5-6 BM a day that are sometimes loose.  She states there is an urgency with BM.  She denies recent travel or antibiotic use.       Focused Physical Exam:  VS:    ED Triage Vitals   BP Temp Temp src Pulse Resp SpO2 Height Weight   -- -- -- -- -- -- -- --        Physical Ex: Constitutional: Alert and non-toxic.    Medical History:  has a past medical history of Anxiety, Depression, Drug abuse (HCC), Hyperhidrosis, Hyperlipidemia, and PTSD (post-traumatic stress disorder).  Surgical History:  has a past surgical history that includes  section and Abscess Drainage.  Social History:  reports that she quit smoking about 3 years ago. Her smoking use included cigarettes. She started smoking about 10 years ago. She has never used smokeless tobacco. She reports current alcohol use. She reports that she does not use drugs.  Family History: family history includes Anxiety Disorder in her maternal grandmother, sister, and sister; Cancer in her mother; Dementia in her maternal grandfather; Depression in her sister and sister; Other in her child and father; Stroke in her maternal grandfather.    Allergies: Patient has no known allergies.     Initial Plan of Care: Initiate Treatment-Testing, Proceed toTreatment Area When Bed Available for ED Attending/MLP

## 2024-06-13 ENCOUNTER — OFFICE VISIT (OUTPATIENT)
Dept: FAMILY MEDICINE CLINIC | Age: 29
End: 2024-06-13
Payer: COMMERCIAL

## 2024-06-13 VITALS
HEART RATE: 110 BPM | HEIGHT: 66 IN | SYSTOLIC BLOOD PRESSURE: 150 MMHG | WEIGHT: 218.4 LBS | DIASTOLIC BLOOD PRESSURE: 94 MMHG | TEMPERATURE: 97.7 F | BODY MASS INDEX: 35.1 KG/M2 | RESPIRATION RATE: 16 BRPM | OXYGEN SATURATION: 98 %

## 2024-06-13 DIAGNOSIS — G89.29 CHRONIC BILATERAL LOW BACK PAIN, UNSPECIFIED WHETHER SCIATICA PRESENT: ICD-10-CM

## 2024-06-13 DIAGNOSIS — R19.8 CHANGE IN BOWEL FUNCTION: ICD-10-CM

## 2024-06-13 DIAGNOSIS — M54.50 CHRONIC BILATERAL LOW BACK PAIN, UNSPECIFIED WHETHER SCIATICA PRESENT: ICD-10-CM

## 2024-06-13 DIAGNOSIS — M54.9 MID BACK PAIN, CHRONIC: ICD-10-CM

## 2024-06-13 DIAGNOSIS — R61 HYPERHIDROSIS: ICD-10-CM

## 2024-06-13 DIAGNOSIS — R19.7 DIARRHEA, UNSPECIFIED TYPE: ICD-10-CM

## 2024-06-13 DIAGNOSIS — G43.009 MIGRAINE WITHOUT AURA AND WITHOUT STATUS MIGRAINOSUS, NOT INTRACTABLE: ICD-10-CM

## 2024-06-13 DIAGNOSIS — R10.84 GENERALIZED ABDOMINAL PAIN: Primary | ICD-10-CM

## 2024-06-13 DIAGNOSIS — G89.29 MID BACK PAIN, CHRONIC: ICD-10-CM

## 2024-06-13 PROCEDURE — 1036F TOBACCO NON-USER: CPT | Performed by: NURSE PRACTITIONER

## 2024-06-13 PROCEDURE — 99214 OFFICE O/P EST MOD 30 MIN: CPT | Performed by: NURSE PRACTITIONER

## 2024-06-13 PROCEDURE — G8427 DOCREV CUR MEDS BY ELIG CLIN: HCPCS | Performed by: NURSE PRACTITIONER

## 2024-06-13 PROCEDURE — G8417 CALC BMI ABV UP PARAM F/U: HCPCS | Performed by: NURSE PRACTITIONER

## 2024-06-13 RX ORDER — GLYCOPYRROLATE 2 MG/1
2 TABLET ORAL 3 TIMES DAILY
COMMUNITY
End: 2024-06-13 | Stop reason: SDUPTHER

## 2024-06-13 RX ORDER — BUTALBITAL, ACETAMINOPHEN AND CAFFEINE 50; 325; 40 MG/1; MG/1; MG/1
1 TABLET ORAL EVERY 4 HOURS PRN
Qty: 30 TABLET | Refills: 0 | Status: SHIPPED | OUTPATIENT
Start: 2024-06-13

## 2024-06-13 RX ORDER — GLYCOPYRROLATE 2 MG/1
2 TABLET ORAL 3 TIMES DAILY
Qty: 150 TABLET | Refills: 1 | Status: SHIPPED | OUTPATIENT
Start: 2024-06-13

## 2024-06-13 RX ORDER — IBUPROFEN 800 MG/1
800 TABLET ORAL EVERY 8 HOURS PRN
Qty: 90 TABLET | Refills: 0 | Status: SHIPPED | OUTPATIENT
Start: 2024-06-13

## 2024-06-13 ASSESSMENT — ENCOUNTER SYMPTOMS
BLOOD IN STOOL: 0
WHEEZING: 0
ABDOMINAL DISTENTION: 1
SHORTNESS OF BREATH: 0
DIARRHEA: 1
NAUSEA: 0
COUGH: 0
VOMITING: 0
ABDOMINAL PAIN: 1
CONSTIPATION: 0
BACK PAIN: 1

## 2024-06-13 ASSESSMENT — PATIENT HEALTH QUESTIONNAIRE - PHQ9
2. FEELING DOWN, DEPRESSED OR HOPELESS: NOT AT ALL
SUM OF ALL RESPONSES TO PHQ QUESTIONS 1-9: 0
SUM OF ALL RESPONSES TO PHQ QUESTIONS 1-9: 0
1. LITTLE INTEREST OR PLEASURE IN DOING THINGS: NOT AT ALL
SUM OF ALL RESPONSES TO PHQ9 QUESTIONS 1 & 2: 0
SUM OF ALL RESPONSES TO PHQ QUESTIONS 1-9: 0
SUM OF ALL RESPONSES TO PHQ QUESTIONS 1-9: 0

## 2024-06-13 NOTE — PROGRESS NOTES
Maria L Ramos (:  1995) is a 29 y.o. female,Established patient, here for evaluation of the following chief complaint(s):  Abdominal Pain (Started in back and wrapped around: went to ER 6/10: feels bloated, pain, gas after eating, going back and forth with diarrhea with mucus and constipation, had CT done and concluded that it is probably IBS)      Assessment & Plan   ASSESSMENT/PLAN:  1. Generalized abdominal pain  2. Diarrhea, unspecified type  3. Change in bowel function  -     External Referral To Gastroenterology    4. Chronic bilateral low back pain, unspecified whether sciatica present  -     ibuprofen (ADVIL;MOTRIN) 800 MG tablet; Take 1 tablet by mouth every 8 hours as needed for Pain, Disp-90 tablet, R-0Normal  - The current medical regimen is effective;  continue present plan and medications.   5. Mid back pain, chronic  -     ibuprofen (ADVIL;MOTRIN) 800 MG tablet; Take 1 tablet by mouth every 8 hours as needed for Pain, Disp-90 tablet, R-0Normal  6. Hyperhidrosis  -     glycopyrrolate (ROBINUL) 2 MG tablet; Take 1 tablet by mouth 3 times daily Take 3 tablets (6Mg) by mouth in the morning, and take 2 tablets (4Mg) by mouth in the evening., Disp-150 tablet, R-1Normal  - The current medical regimen is effective;  continue present plan and medications.   7. Migraine without aura and without status migrainosus, not intractable  -     butalbital-acetaminophen-caffeine (FIORICET, ESGIC) -40 MG per tablet; Take 1 tablet by mouth every 4 hours as needed for Headaches, Disp-30 tablet, R-0Normal  - The current medical regimen is effective;  continue present plan and medications.     Controlled Substance Monitoring:    Acute and Chronic Pain Monitoring:   RX Monitoring Periodic Controlled Substance Monitoring   2024   5:30 PM No signs of potential drug abuse or diversion identified.;Assessed functional status (ability to engage in work or other purposeful activities, the pain intensity and its

## 2024-07-15 DIAGNOSIS — M54.9 MID BACK PAIN, CHRONIC: ICD-10-CM

## 2024-07-15 DIAGNOSIS — R61 HYPERHIDROSIS: ICD-10-CM

## 2024-07-15 DIAGNOSIS — G89.29 CHRONIC BILATERAL LOW BACK PAIN, UNSPECIFIED WHETHER SCIATICA PRESENT: ICD-10-CM

## 2024-07-15 DIAGNOSIS — M54.50 CHRONIC BILATERAL LOW BACK PAIN, UNSPECIFIED WHETHER SCIATICA PRESENT: ICD-10-CM

## 2024-07-15 DIAGNOSIS — G89.29 MID BACK PAIN, CHRONIC: ICD-10-CM

## 2024-07-15 RX ORDER — GLYCOPYRROLATE 2 MG/1
2 TABLET ORAL 3 TIMES DAILY
Qty: 150 TABLET | Refills: 1 | Status: SHIPPED
Start: 2024-07-15 | End: 2024-07-18 | Stop reason: SDUPTHER

## 2024-07-15 RX ORDER — IBUPROFEN 800 MG/1
800 TABLET ORAL EVERY 8 HOURS PRN
Qty: 90 TABLET | Refills: 0 | Status: SHIPPED | OUTPATIENT
Start: 2024-07-15

## 2024-07-18 DIAGNOSIS — R61 HYPERHIDROSIS: ICD-10-CM

## 2024-07-18 RX ORDER — GLYCOPYRROLATE 2 MG/1
TABLET ORAL
Qty: 150 TABLET | Refills: 1 | Status: SHIPPED | OUTPATIENT
Start: 2024-07-18

## 2024-07-18 NOTE — TELEPHONE ENCOUNTER
Requested Prescriptions     Pending Prescriptions Disp Refills    glycopyrrolate (ROBINUL) 2 MG tablet 150 tablet 1     Sig: Take 3 tablets (6Mg) by mouth in the morning, and take 2 tablets (4Mg) by mouth in the evening.       Next appt is Visit date not found  Last appt was 6/13/2024

## 2024-09-03 ENCOUNTER — TELEPHONE (OUTPATIENT)
Dept: FAMILY MEDICINE CLINIC | Age: 29
End: 2024-09-03

## 2024-09-03 ENCOUNTER — TELEMEDICINE (OUTPATIENT)
Dept: FAMILY MEDICINE CLINIC | Age: 29
End: 2024-09-03
Payer: COMMERCIAL

## 2024-09-03 DIAGNOSIS — G89.29 MID BACK PAIN, CHRONIC: ICD-10-CM

## 2024-09-03 DIAGNOSIS — G89.29 CHRONIC BILATERAL LOW BACK PAIN, UNSPECIFIED WHETHER SCIATICA PRESENT: ICD-10-CM

## 2024-09-03 DIAGNOSIS — B37.0 THRUSH, ORAL: Primary | ICD-10-CM

## 2024-09-03 DIAGNOSIS — M54.9 MID BACK PAIN, CHRONIC: ICD-10-CM

## 2024-09-03 DIAGNOSIS — R61 HYPERHIDROSIS: ICD-10-CM

## 2024-09-03 DIAGNOSIS — M54.50 CHRONIC BILATERAL LOW BACK PAIN, UNSPECIFIED WHETHER SCIATICA PRESENT: ICD-10-CM

## 2024-09-03 PROCEDURE — G8427 DOCREV CUR MEDS BY ELIG CLIN: HCPCS | Performed by: NURSE PRACTITIONER

## 2024-09-03 PROCEDURE — G8417 CALC BMI ABV UP PARAM F/U: HCPCS | Performed by: NURSE PRACTITIONER

## 2024-09-03 PROCEDURE — 1036F TOBACCO NON-USER: CPT | Performed by: NURSE PRACTITIONER

## 2024-09-03 PROCEDURE — 99213 OFFICE O/P EST LOW 20 MIN: CPT | Performed by: NURSE PRACTITIONER

## 2024-09-03 RX ORDER — CLOTRIMAZOLE 10 MG/1
10 LOZENGE ORAL
Qty: 50 TABLET | Refills: 0 | Status: SHIPPED | OUTPATIENT
Start: 2024-09-03 | End: 2024-09-13

## 2024-09-03 RX ORDER — GLYCOPYRROLATE 2 MG/1
TABLET ORAL
Qty: 150 TABLET | Refills: 1 | Status: SHIPPED | OUTPATIENT
Start: 2024-09-03

## 2024-09-03 RX ORDER — NYSTATIN 100000/ML
500000 SUSPENSION, ORAL (FINAL DOSE FORM) ORAL 4 TIMES DAILY
Qty: 200 ML | Refills: 0 | Status: SHIPPED | OUTPATIENT
Start: 2024-09-03

## 2024-09-03 RX ORDER — IBUPROFEN 800 MG/1
800 TABLET, FILM COATED ORAL EVERY 8 HOURS PRN
Qty: 90 TABLET | Refills: 0 | Status: SHIPPED | OUTPATIENT
Start: 2024-09-03

## 2024-09-03 ASSESSMENT — PATIENT HEALTH QUESTIONNAIRE - PHQ9
SUM OF ALL RESPONSES TO PHQ QUESTIONS 1-9: 0
2. FEELING DOWN, DEPRESSED OR HOPELESS: NOT AT ALL
1. LITTLE INTEREST OR PLEASURE IN DOING THINGS: NOT AT ALL
SUM OF ALL RESPONSES TO PHQ QUESTIONS 1-9: 0
SUM OF ALL RESPONSES TO PHQ9 QUESTIONS 1 & 2: 0

## 2024-09-03 ASSESSMENT — ENCOUNTER SYMPTOMS
NAUSEA: 0
CONSTIPATION: 0
SHORTNESS OF BREATH: 0
WHEEZING: 0
DIARRHEA: 0
COUGH: 0
VOMITING: 0

## 2024-09-03 NOTE — PROGRESS NOTES
Maria L Ramos, was evaluated through a synchronous (real-time) audio-video encounter. The patient (or guardian if applicable) is aware that this is a billable service, which includes applicable co-pays. This Virtual Visit was conducted with patient's (and/or legal guardian's) consent. Patient identification was verified, and a caregiver was present when appropriate.   The patient was located at Home: [address] [facility] [address]  Provider was located at Facility (Appt Dept): [address] [place] [id number]  Confirm you are appropriately licensed, registered, or certified to deliver care in the state where the patient is located as indicated above. If you are not or unsure, please re-schedule the visit: Yes, I confirm.     Maria L Ramos (:  1995) is a Established patient, presenting virtually for evaluation of the following:      Below is the assessment and plan developed based on review of pertinent history, physical exam, labs, studies, and medications.     Assessment & Plan  Hyperhidrosis   Chronic, at goal (stable), continue current treatment plan    Orders:    glycopyrrolate (ROBINUL) 2 MG tablet; Take 3 tablets (6Mg) by mouth in the morning, and take 2 tablets (4Mg) by mouth in the evening.    Chronic bilateral low back pain, unspecified whether sciatica present   Chronic, at goal (stable), continue current treatment plan    Orders:    ibuprofen (ADVIL;MOTRIN) 800 MG tablet; Take 1 tablet by mouth every 8 hours as needed for Pain    Mid back pain, chronic   Chronic, at goal (stable), continue current treatment plan    Orders:    ibuprofen (ADVIL;MOTRIN) 800 MG tablet; Take 1 tablet by mouth every 8 hours as needed for Pain    Thrush, oral   Acute condition, new, nystatin as ordered, continue probiotics, yogurt    Orders:    nystatin (MYCOSTATIN) 455107 UNIT/ML suspension; Take 5 mLs by mouth 4 times daily  Contact office if symptoms do not improve as expected or

## 2024-09-03 NOTE — TELEPHONE ENCOUNTER
Pharmacy sent fax stating supplier is out of nystatin susp. And they are requesting an alternate medication.

## 2024-09-12 DIAGNOSIS — M54.50 CHRONIC BILATERAL LOW BACK PAIN, UNSPECIFIED WHETHER SCIATICA PRESENT: ICD-10-CM

## 2024-09-12 DIAGNOSIS — G89.29 MID BACK PAIN, CHRONIC: ICD-10-CM

## 2024-09-12 DIAGNOSIS — R61 HYPERHIDROSIS: ICD-10-CM

## 2024-09-12 DIAGNOSIS — M54.9 MID BACK PAIN, CHRONIC: ICD-10-CM

## 2024-09-12 DIAGNOSIS — G89.29 CHRONIC BILATERAL LOW BACK PAIN, UNSPECIFIED WHETHER SCIATICA PRESENT: ICD-10-CM

## 2024-09-12 RX ORDER — IBUPROFEN 800 MG/1
800 TABLET, FILM COATED ORAL EVERY 8 HOURS PRN
Qty: 90 TABLET | Refills: 0 | OUTPATIENT
Start: 2024-09-12

## 2024-09-12 RX ORDER — GLYCOPYRROLATE 2 MG/1
TABLET ORAL
Qty: 150 TABLET | Refills: 1 | OUTPATIENT
Start: 2024-09-12

## 2024-11-06 DIAGNOSIS — G89.29 CHRONIC BILATERAL LOW BACK PAIN, UNSPECIFIED WHETHER SCIATICA PRESENT: ICD-10-CM

## 2024-11-06 DIAGNOSIS — G43.009 MIGRAINE WITHOUT AURA AND WITHOUT STATUS MIGRAINOSUS, NOT INTRACTABLE: ICD-10-CM

## 2024-11-06 DIAGNOSIS — M54.9 MID BACK PAIN, CHRONIC: ICD-10-CM

## 2024-11-06 DIAGNOSIS — M54.50 CHRONIC BILATERAL LOW BACK PAIN, UNSPECIFIED WHETHER SCIATICA PRESENT: ICD-10-CM

## 2024-11-06 DIAGNOSIS — R61 HYPERHIDROSIS: ICD-10-CM

## 2024-11-06 DIAGNOSIS — G89.29 MID BACK PAIN, CHRONIC: ICD-10-CM

## 2024-11-06 RX ORDER — GLYCOPYRROLATE 2 MG/1
TABLET ORAL
Qty: 150 TABLET | Refills: 1 | Status: SHIPPED | OUTPATIENT
Start: 2024-11-06

## 2024-11-06 RX ORDER — BUTALBITAL, ACETAMINOPHEN AND CAFFEINE 50; 325; 40 MG/1; MG/1; MG/1
1 TABLET ORAL EVERY 4 HOURS PRN
Qty: 30 TABLET | Refills: 0 | Status: SHIPPED | OUTPATIENT
Start: 2024-11-06

## 2024-11-06 RX ORDER — IBUPROFEN 800 MG/1
800 TABLET, FILM COATED ORAL EVERY 8 HOURS PRN
Qty: 90 TABLET | Refills: 0 | Status: SHIPPED | OUTPATIENT
Start: 2024-11-06

## 2024-11-06 NOTE — TELEPHONE ENCOUNTER
Name of Medication(s) Requested:  Requested Prescriptions     Pending Prescriptions Disp Refills    glycopyrrolate (ROBINUL) 2 MG tablet 150 tablet 1     Sig: Take 3 tablets (6Mg) by mouth in the morning, and take 2 tablets (4Mg) by mouth in the evening.    ibuprofen (ADVIL;MOTRIN) 800 MG tablet 90 tablet 0     Sig: Take 1 tablet by mouth every 8 hours as needed for Pain       Medication is on current medication list Yes    Dosage and directions were verified? Yes    Quantity verified: 30 day supply     Pharmacy Verified?  Yes    Last Appointment:  9/3/2024    Future appts:  No future appointments.     (If no appt send self scheduling link. .REFILLAPPT)  Scheduling request sent?     [] Yes  [x] No    Does patient need updated?  [] Yes  [x] No

## 2024-11-06 NOTE — TELEPHONE ENCOUNTER
Name of Medication(s) Requested:  Requested Prescriptions     Pending Prescriptions Disp Refills    butalbital-acetaminophen-caffeine (FIORICET, ESGIC) -40 MG per tablet 30 tablet 0     Sig: Take 1 tablet by mouth every 4 hours as needed for Headaches       Medication is on current medication list Yes    Dosage and directions were verified? Yes    Quantity verified: 30 day supply     Pharmacy Verified?  Yes    Last Appointment:  6/13/2024    Future appts:  No future appointments.     (If no appt send self scheduling link. .REFILLAPPT)  Scheduling request sent?     [] Yes  [x] No    Does patient need updated?  [] Yes  [x] No

## 2024-12-17 ENCOUNTER — OFFICE VISIT (OUTPATIENT)
Dept: FAMILY MEDICINE CLINIC | Age: 29
End: 2024-12-17
Payer: COMMERCIAL

## 2024-12-17 VITALS
HEART RATE: 107 BPM | DIASTOLIC BLOOD PRESSURE: 88 MMHG | OXYGEN SATURATION: 99 % | HEIGHT: 66 IN | TEMPERATURE: 98.4 F | WEIGHT: 222.4 LBS | BODY MASS INDEX: 35.74 KG/M2 | RESPIRATION RATE: 16 BRPM | SYSTOLIC BLOOD PRESSURE: 136 MMHG

## 2024-12-17 DIAGNOSIS — M54.9 MID BACK PAIN, CHRONIC: ICD-10-CM

## 2024-12-17 DIAGNOSIS — R53.83 FATIGUE, UNSPECIFIED TYPE: ICD-10-CM

## 2024-12-17 DIAGNOSIS — J06.9 VIRAL URI: ICD-10-CM

## 2024-12-17 DIAGNOSIS — Z00.00 ENCOUNTER FOR WELL ADULT EXAM WITHOUT ABNORMAL FINDINGS: Primary | ICD-10-CM

## 2024-12-17 DIAGNOSIS — M54.50 CHRONIC BILATERAL LOW BACK PAIN, UNSPECIFIED WHETHER SCIATICA PRESENT: ICD-10-CM

## 2024-12-17 DIAGNOSIS — G89.29 MID BACK PAIN, CHRONIC: ICD-10-CM

## 2024-12-17 DIAGNOSIS — G89.29 CHRONIC BILATERAL LOW BACK PAIN, UNSPECIFIED WHETHER SCIATICA PRESENT: ICD-10-CM

## 2024-12-17 DIAGNOSIS — E55.9 VITAMIN D DEFICIENCY: ICD-10-CM

## 2024-12-17 DIAGNOSIS — R63.8 UNABLE TO LOSE WEIGHT: ICD-10-CM

## 2024-12-17 DIAGNOSIS — R61 HYPERHIDROSIS: ICD-10-CM

## 2024-12-17 PROCEDURE — 1036F TOBACCO NON-USER: CPT | Performed by: NURSE PRACTITIONER

## 2024-12-17 PROCEDURE — G8427 DOCREV CUR MEDS BY ELIG CLIN: HCPCS | Performed by: NURSE PRACTITIONER

## 2024-12-17 PROCEDURE — 99395 PREV VISIT EST AGE 18-39: CPT | Performed by: NURSE PRACTITIONER

## 2024-12-17 PROCEDURE — G8484 FLU IMMUNIZE NO ADMIN: HCPCS | Performed by: NURSE PRACTITIONER

## 2024-12-17 PROCEDURE — G8417 CALC BMI ABV UP PARAM F/U: HCPCS | Performed by: NURSE PRACTITIONER

## 2024-12-17 PROCEDURE — 99213 OFFICE O/P EST LOW 20 MIN: CPT | Performed by: NURSE PRACTITIONER

## 2024-12-17 RX ORDER — METHYLPREDNISOLONE 4 MG/1
TABLET ORAL
Qty: 1 KIT | Refills: 0 | Status: SHIPPED | OUTPATIENT
Start: 2024-12-17

## 2024-12-17 RX ORDER — IBUPROFEN 800 MG/1
800 TABLET, FILM COATED ORAL EVERY 8 HOURS PRN
Qty: 90 TABLET | Refills: 0 | Status: SHIPPED | OUTPATIENT
Start: 2024-12-17

## 2024-12-17 RX ORDER — DEXTROMETHORPHAN HYDROBROMIDE AND PROMETHAZINE HYDROCHLORIDE 15; 6.25 MG/5ML; MG/5ML
5 SYRUP ORAL 4 TIMES DAILY PRN
Qty: 120 ML | Refills: 0 | Status: SHIPPED | OUTPATIENT
Start: 2024-12-17 | End: 2024-12-27

## 2024-12-17 RX ORDER — GLYCOPYRROLATE 2 MG/1
TABLET ORAL
Qty: 150 TABLET | Refills: 1 | Status: SHIPPED | OUTPATIENT
Start: 2024-12-17

## 2024-12-17 ASSESSMENT — ENCOUNTER SYMPTOMS
NAUSEA: 0
DIARRHEA: 1
CHEST TIGHTNESS: 0
VOMITING: 0
ABDOMINAL PAIN: 0
SORE THROAT: 1
WHEEZING: 1
BACK PAIN: 1
COUGH: 1
BLOOD IN STOOL: 0
SHORTNESS OF BREATH: 0
CONSTIPATION: 0

## 2024-12-17 ASSESSMENT — PATIENT HEALTH QUESTIONNAIRE - PHQ9
SUM OF ALL RESPONSES TO PHQ QUESTIONS 1-9: 1
SUM OF ALL RESPONSES TO PHQ QUESTIONS 1-9: 1
SUM OF ALL RESPONSES TO PHQ9 QUESTIONS 1 & 2: 1
1. LITTLE INTEREST OR PLEASURE IN DOING THINGS: SEVERAL DAYS
SUM OF ALL RESPONSES TO PHQ QUESTIONS 1-9: 1
2. FEELING DOWN, DEPRESSED OR HOPELESS: NOT AT ALL
SUM OF ALL RESPONSES TO PHQ QUESTIONS 1-9: 1

## 2024-12-17 NOTE — PROGRESS NOTES
Special needs/autism/adhd/bipolar     Social History     Tobacco Use    Smoking status: Former     Current packs/day: 0.00     Types: Cigarettes     Start date: 4/28/2014     Quit date: 4/28/2021     Years since quitting: 3.6    Smokeless tobacco: Never   Vaping Use    Vaping status: Every Day    Substances: Nicotine    Devices: Disposable   Substance Use Topics    Alcohol use: Yes     Comment: socially    Drug use: No     Types: IV     Comment: heroin  (clean since 2015)           Objective    Vital Signs  /88   Pulse (!) 107   Temp 98.4 °F (36.9 °C)   Resp 16   Ht 1.676 m (5' 5.98\")   Wt 100.9 kg (222 lb 6.4 oz)   LMP 12/04/2024   SpO2 99%   BMI 35.91 kg/m²     Wt Readings from Last 3 Encounters:   12/17/24 100.9 kg (222 lb 6.4 oz)   06/13/24 99.1 kg (218 lb 6.4 oz)   06/10/24 108 kg (238 lb)       Physical Exam  Constitutional:       General: She is not in acute distress.     Appearance: Normal appearance. She is well-developed.   HENT:      Head: Normocephalic and atraumatic.      Right Ear: Tympanic membrane and external ear normal.      Left Ear: Tympanic membrane and external ear normal.      Ears:      Comments: Canals with slight erythema     Nose: Congestion present.      Mouth/Throat:      Mouth: Mucous membranes are moist.      Pharynx: Posterior oropharyngeal erythema (mild) present.      Comments: Post nasal drainage  Eyes:      Extraocular Movements: Extraocular movements intact.      Conjunctiva/sclera: Conjunctivae normal.      Pupils: Pupils are equal, round, and reactive to light.   Neck:      Thyroid: No thyromegaly.      Vascular: No carotid bruit.      Trachea: No tracheal deviation.   Cardiovascular:      Rate and Rhythm: Normal rate and regular rhythm.      Pulses: Normal pulses.      Heart sounds: Normal heart sounds. No murmur heard.  Pulmonary:      Effort: Pulmonary effort is normal. No respiratory distress.      Breath sounds: Normal breath sounds. No wheezing, rhonchi or

## 2024-12-27 ENCOUNTER — TELEPHONE (OUTPATIENT)
Dept: INTERNAL MEDICINE CLINIC | Age: 29
End: 2024-12-27

## 2025-01-13 NOTE — TELEPHONE ENCOUNTER
Pts charisma notified that Dr. Awadalla is not excepting new pts for the time being due to still covering for Dr Altawil

## 2025-01-14 ENCOUNTER — TELEPHONE (OUTPATIENT)
Dept: INTERNAL MEDICINE CLINIC | Age: 30
End: 2025-01-14

## 2025-01-14 DIAGNOSIS — R61 HYPERHIDROSIS: ICD-10-CM

## 2025-01-14 RX ORDER — GLYCOPYRROLATE 2 MG/1
TABLET ORAL
Qty: 150 TABLET | Refills: 1 | Status: SHIPPED | OUTPATIENT
Start: 2025-01-14

## 2025-01-14 NOTE — TELEPHONE ENCOUNTER
Name of Medication(s) Requested:  Requested Prescriptions     Pending Prescriptions Disp Refills    glycopyrrolate (ROBINUL) 2 MG tablet 150 tablet 1     Sig: Take 5 tablets in am       Medication is on current medication list Yes    Dosage and directions were verified? Yes    Quantity verified: 90 day supply     Pharmacy Verified?  Yes    Last Appointment:  12/17/2024    Future appts:  Future Appointments   Date Time Provider Department Center   2/24/2025 11:30 AM Padmaja Novoa MD [facility]        (If no appt send self scheduling link. .REFILLAPPT)  Scheduling request sent?     [] Yes  [x] No    Does patient need updated?  [] Yes  [x] No

## 2025-01-14 NOTE — TELEPHONE ENCOUNTER
Pt called, disregard last message from 12/27/24-Correction- pt called back, it was promethazine that had the reaction with xanax. Pt was being seen there for being sick with upper respiratory for 2 months and a UTI.

## 2025-02-19 DIAGNOSIS — G89.29 CHRONIC BILATERAL LOW BACK PAIN, UNSPECIFIED WHETHER SCIATICA PRESENT: ICD-10-CM

## 2025-02-19 DIAGNOSIS — G89.29 MID BACK PAIN, CHRONIC: ICD-10-CM

## 2025-02-19 DIAGNOSIS — M54.9 MID BACK PAIN, CHRONIC: ICD-10-CM

## 2025-02-19 DIAGNOSIS — M54.50 CHRONIC BILATERAL LOW BACK PAIN, UNSPECIFIED WHETHER SCIATICA PRESENT: ICD-10-CM

## 2025-02-19 DIAGNOSIS — R61 HYPERHIDROSIS: ICD-10-CM

## 2025-02-19 RX ORDER — GLYCOPYRROLATE 2 MG/1
TABLET ORAL
Qty: 150 TABLET | Refills: 1 | Status: SHIPPED | OUTPATIENT
Start: 2025-02-19

## 2025-02-19 RX ORDER — IBUPROFEN 800 MG/1
800 TABLET, FILM COATED ORAL EVERY 8 HOURS PRN
Qty: 90 TABLET | Refills: 0 | Status: SHIPPED | OUTPATIENT
Start: 2025-02-19

## 2025-02-19 NOTE — TELEPHONE ENCOUNTER
Name of Medication(s) Requested:  Requested Prescriptions     Pending Prescriptions Disp Refills    ibuprofen (ADVIL;MOTRIN) 800 MG tablet 90 tablet 0     Sig: Take 1 tablet by mouth every 8 hours as needed for Pain    glycopyrrolate (ROBINUL) 2 MG tablet 150 tablet 1     Sig: Take 5 tablets in am       Medication is on current medication list Yes    Dosage and directions were verified? Yes    Quantity verified: 30 day supply     Pharmacy Verified?  Yes    Last Appointment:  12/17/2024    Future appts:  Future Appointments   Date Time Provider Department Center   2/24/2025 11:30 AM Padmaja Novoa MD [facility]        (If no appt send self scheduling link. .REFILLAPPT)  Scheduling request sent?     [] Yes  [x] No    Does patient need updated?  [] Yes  [x] No

## 2025-02-24 ENCOUNTER — TELEPHONE (OUTPATIENT)
Dept: INTERNAL MEDICINE CLINIC | Age: 30
End: 2025-02-24

## 2025-03-07 LAB
CLINICAL INFORMATION: ABNORMAL
COMMENT: ABNORMAL
COMMENT: NORMAL
DATE OF PREVIOUS BIOPSY: ABNORMAL
DATE OF PREVIOUS PAP: ABNORMAL
HB: COMMENT: ABNORMAL
HB: CYTOTECHNOLT: ABNORMAL
HB: CYTOTECHNOLT: ABNORMAL
HB: INTERPT: ABNORMAL
HB: SOURCE: ABNORMAL
HPV 16 RNA: NOT DETECTED
HPV MRNA E6/E7: DETECTED
HUMAN PAPILLOMA VIRUS 18+45 E6+E7 MRNA: NOT DETECTED
LAST MENSTRUAL PERIOD: ABNORMAL
SPECIMEN ADEQUACY:: ABNORMAL

## 2025-03-20 ENCOUNTER — OFFICE VISIT (OUTPATIENT)
Dept: FAMILY MEDICINE CLINIC | Age: 30
End: 2025-03-20
Payer: COMMERCIAL

## 2025-03-20 VITALS
DIASTOLIC BLOOD PRESSURE: 98 MMHG | WEIGHT: 222 LBS | RESPIRATION RATE: 19 BRPM | SYSTOLIC BLOOD PRESSURE: 138 MMHG | OXYGEN SATURATION: 100 % | BODY MASS INDEX: 35.68 KG/M2 | HEIGHT: 66 IN | TEMPERATURE: 97.4 F | HEART RATE: 97 BPM

## 2025-03-20 DIAGNOSIS — R58 BLEEDING: ICD-10-CM

## 2025-03-20 DIAGNOSIS — N93.9 VAGINAL BLEEDING: Primary | ICD-10-CM

## 2025-03-20 LAB
BILIRUBIN, POC: NORMAL
BLOOD URINE, POC: NORMAL
CLARITY, POC: CLEAR
COLOR, POC: YELLOW
CONTROL: NORMAL
GLUCOSE URINE, POC: NORMAL MG/DL
KETONES, POC: NORMAL MG/DL
LEUKOCYTE EST, POC: NORMAL
NITRITE, POC: NORMAL
PH, POC: 6
PREGNANCY TEST URINE, POC: NEGATIVE
PROTEIN, POC: NORMAL MG/DL
SPECIFIC GRAVITY, POC: 1
UROBILINOGEN, POC: 0.2 MG/DL

## 2025-03-20 PROCEDURE — 99213 OFFICE O/P EST LOW 20 MIN: CPT

## 2025-03-20 PROCEDURE — G8417 CALC BMI ABV UP PARAM F/U: HCPCS

## 2025-03-20 PROCEDURE — G8427 DOCREV CUR MEDS BY ELIG CLIN: HCPCS

## 2025-03-20 PROCEDURE — 81002 URINALYSIS NONAUTO W/O SCOPE: CPT

## 2025-03-20 PROCEDURE — 81025 URINE PREGNANCY TEST: CPT

## 2025-03-20 PROCEDURE — 1036F TOBACCO NON-USER: CPT

## 2025-03-20 SDOH — ECONOMIC STABILITY: FOOD INSECURITY: WITHIN THE PAST 12 MONTHS, THE FOOD YOU BOUGHT JUST DIDN'T LAST AND YOU DIDN'T HAVE MONEY TO GET MORE.: NEVER TRUE

## 2025-03-20 SDOH — ECONOMIC STABILITY: FOOD INSECURITY: WITHIN THE PAST 12 MONTHS, YOU WORRIED THAT YOUR FOOD WOULD RUN OUT BEFORE YOU GOT MONEY TO BUY MORE.: NEVER TRUE

## 2025-03-20 ASSESSMENT — PATIENT HEALTH QUESTIONNAIRE - PHQ9
SUM OF ALL RESPONSES TO PHQ QUESTIONS 1-9: 0
2. FEELING DOWN, DEPRESSED OR HOPELESS: NOT AT ALL
1. LITTLE INTEREST OR PLEASURE IN DOING THINGS: NOT AT ALL
SUM OF ALL RESPONSES TO PHQ QUESTIONS 1-9: 0
DEPRESSION UNABLE TO ASSESS: FUNCTIONAL CAPACITY MOTIVATION LIMITS ACCURACY
SUM OF ALL RESPONSES TO PHQ QUESTIONS 1-9: 0
SUM OF ALL RESPONSES TO PHQ QUESTIONS 1-9: 0

## 2025-03-20 ASSESSMENT — ENCOUNTER SYMPTOMS
WHEEZING: 0
EYE REDNESS: 0
EYE DISCHARGE: 0
DIARRHEA: 0
ABDOMINAL DISTENTION: 0
VOMITING: 0
SHORTNESS OF BREATH: 0
RECTAL PAIN: 0
CONSTIPATION: 0
COUGH: 0
EYE PAIN: 0
NAUSEA: 0
SINUS PRESSURE: 0
ABDOMINAL PAIN: 0
BLOOD IN STOOL: 0
BACK PAIN: 0
SORE THROAT: 0

## 2025-03-21 ENCOUNTER — RESULTS FOLLOW-UP (OUTPATIENT)
Dept: FAMILY MEDICINE CLINIC | Age: 30
End: 2025-03-21

## 2025-04-02 DIAGNOSIS — G89.29 CHRONIC BILATERAL LOW BACK PAIN, UNSPECIFIED WHETHER SCIATICA PRESENT: ICD-10-CM

## 2025-04-02 DIAGNOSIS — R61 HYPERHIDROSIS: ICD-10-CM

## 2025-04-02 DIAGNOSIS — M54.9 MID BACK PAIN, CHRONIC: ICD-10-CM

## 2025-04-02 DIAGNOSIS — M54.50 CHRONIC BILATERAL LOW BACK PAIN, UNSPECIFIED WHETHER SCIATICA PRESENT: ICD-10-CM

## 2025-04-02 DIAGNOSIS — G89.29 MID BACK PAIN, CHRONIC: ICD-10-CM

## 2025-04-02 RX ORDER — GLYCOPYRROLATE 2 MG/1
TABLET ORAL
Qty: 150 TABLET | Refills: 1 | Status: SHIPPED | OUTPATIENT
Start: 2025-04-02

## 2025-04-02 RX ORDER — IBUPROFEN 800 MG/1
800 TABLET, FILM COATED ORAL EVERY 8 HOURS PRN
Qty: 90 TABLET | Refills: 0 | Status: SHIPPED | OUTPATIENT
Start: 2025-04-02

## 2025-04-02 NOTE — TELEPHONE ENCOUNTER
Name of Medication(s) Requested:  Requested Prescriptions     Pending Prescriptions Disp Refills    ibuprofen (ADVIL;MOTRIN) 800 MG tablet 90 tablet 0     Sig: Take 1 tablet by mouth every 8 hours as needed for Pain    glycopyrrolate (ROBINUL) 2 MG tablet 150 tablet 1     Sig: Take 5 tablets in am       Medication is on current medication list Yes    Dosage and directions were verified? Yes    Quantity verified: 30 day supply     Pharmacy Verified?  Yes    Last Appointment:  Visit date not found    Future appts:  Future Appointments   Date Time Provider Department Center   4/10/2025  9:00 AM Awadalla, Bahaa A, MD [facility]   4/24/2025 10:00 AM Adama Glez APRN - [facility]        (If no appt send self scheduling link. .REFILLAPPT)  Scheduling request sent?     [] Yes  [x] No    Does patient need updated?  [] Yes  [x] No

## 2025-04-07 SDOH — HEALTH STABILITY: PHYSICAL HEALTH: ON AVERAGE, HOW MANY MINUTES DO YOU ENGAGE IN EXERCISE AT THIS LEVEL?: 30 MIN

## 2025-04-07 SDOH — HEALTH STABILITY: PHYSICAL HEALTH: ON AVERAGE, HOW MANY DAYS PER WEEK DO YOU ENGAGE IN MODERATE TO STRENUOUS EXERCISE (LIKE A BRISK WALK)?: 3 DAYS

## 2025-04-10 ENCOUNTER — OFFICE VISIT (OUTPATIENT)
Dept: INTERNAL MEDICINE CLINIC | Age: 30
End: 2025-04-10
Payer: COMMERCIAL

## 2025-04-10 VITALS
HEIGHT: 66 IN | OXYGEN SATURATION: 98 % | SYSTOLIC BLOOD PRESSURE: 118 MMHG | HEART RATE: 108 BPM | WEIGHT: 216 LBS | TEMPERATURE: 98.1 F | DIASTOLIC BLOOD PRESSURE: 74 MMHG | BODY MASS INDEX: 34.72 KG/M2

## 2025-04-10 DIAGNOSIS — E66.9 OBESITY (BMI 30-39.9): ICD-10-CM

## 2025-04-10 DIAGNOSIS — R41.3 MEMORY LOSS: ICD-10-CM

## 2025-04-10 DIAGNOSIS — R61 HYPERHIDROSIS: ICD-10-CM

## 2025-04-10 DIAGNOSIS — F32.A ANXIETY AND DEPRESSION: Primary | ICD-10-CM

## 2025-04-10 DIAGNOSIS — F90.8 OTHER SPECIFIED ATTENTION DEFICIT HYPERACTIVITY DISORDER (ADHD): ICD-10-CM

## 2025-04-10 DIAGNOSIS — F43.10 PTSD (POST-TRAUMATIC STRESS DISORDER): ICD-10-CM

## 2025-04-10 DIAGNOSIS — F41.9 ANXIETY AND DEPRESSION: Primary | ICD-10-CM

## 2025-04-10 DIAGNOSIS — Z87.898 HISTORY OF INTRAVENOUS DRUG USE: ICD-10-CM

## 2025-04-10 LAB
ALBUMIN: 4.2 G/DL (ref 3.5–5.2)
ALP BLD-CCNC: 76 U/L (ref 35–104)
ALT SERPL-CCNC: 23 U/L (ref 0–32)
ANION GAP SERPL CALCULATED.3IONS-SCNC: 15 MMOL/L (ref 7–16)
AST SERPL-CCNC: 27 U/L (ref 0–31)
BASOPHILS ABSOLUTE: 0.04 K/UL (ref 0–0.2)
BASOPHILS RELATIVE PERCENT: 1 % (ref 0–2)
BILIRUB SERPL-MCNC: 0.2 MG/DL (ref 0–1.2)
BUN BLDV-MCNC: 16 MG/DL (ref 6–20)
CALCIUM SERPL-MCNC: 9.4 MG/DL (ref 8.6–10.2)
CHLORIDE BLD-SCNC: 104 MMOL/L (ref 98–107)
CHOLESTEROL, FASTING: 175 MG/DL
CO2: 18 MMOL/L (ref 22–29)
CREAT SERPL-MCNC: 0.8 MG/DL (ref 0.5–1)
EOSINOPHILS ABSOLUTE: 0.06 K/UL (ref 0.05–0.5)
EOSINOPHILS RELATIVE PERCENT: 1 % (ref 0–6)
FOLATE: 13.8 NG/ML (ref 4.8–24.2)
GFR, ESTIMATED: >90 ML/MIN/1.73M2
GLUCOSE BLD-MCNC: 92 MG/DL (ref 74–99)
HCT VFR BLD CALC: 40.9 % (ref 34–48)
HDLC SERPL-MCNC: 43 MG/DL
HEMOGLOBIN: 13.8 G/DL (ref 11.5–15.5)
IMMATURE GRANULOCYTES %: 1 % (ref 0–5)
IMMATURE GRANULOCYTES ABSOLUTE: 0.03 K/UL (ref 0–0.58)
LDL CHOLESTEROL: 119 MG/DL
LYMPHOCYTES ABSOLUTE: 2.01 K/UL (ref 1.5–4)
LYMPHOCYTES RELATIVE PERCENT: 32 % (ref 20–42)
MCH RBC QN AUTO: 30.9 PG (ref 26–35)
MCHC RBC AUTO-ENTMCNC: 33.7 G/DL (ref 32–34.5)
MCV RBC AUTO: 91.7 FL (ref 80–99.9)
MONOCYTES ABSOLUTE: 0.35 K/UL (ref 0.1–0.95)
MONOCYTES RELATIVE PERCENT: 6 % (ref 2–12)
NEUTROPHILS ABSOLUTE: 3.75 K/UL (ref 1.8–7.3)
NEUTROPHILS RELATIVE PERCENT: 60 % (ref 43–80)
PDW BLD-RTO: 13 % (ref 11.5–15)
PLATELET # BLD: 387 K/UL (ref 130–450)
PMV BLD AUTO: 9.8 FL (ref 7–12)
POTASSIUM SERPL-SCNC: 5.1 MMOL/L (ref 3.5–5)
RBC # BLD: 4.46 M/UL (ref 3.5–5.5)
SODIUM BLD-SCNC: 137 MMOL/L (ref 132–146)
T4 FREE: 1 NG/DL (ref 0.9–1.7)
TOTAL PROTEIN: 7.2 G/DL (ref 6.4–8.3)
TRIGLYCERIDE, FASTING: 66 MG/DL
TSH SERPL DL<=0.05 MIU/L-ACNC: 2 UIU/ML (ref 0.27–4.2)
VITAMIN B-12: 535 PG/ML (ref 211–946)
VLDLC SERPL CALC-MCNC: 13 MG/DL
WBC # BLD: 6.2 K/UL (ref 4.5–11.5)

## 2025-04-10 PROCEDURE — 99204 OFFICE O/P NEW MOD 45 MIN: CPT | Performed by: INTERNAL MEDICINE

## 2025-04-10 PROCEDURE — G8417 CALC BMI ABV UP PARAM F/U: HCPCS | Performed by: INTERNAL MEDICINE

## 2025-04-10 PROCEDURE — G8427 DOCREV CUR MEDS BY ELIG CLIN: HCPCS | Performed by: INTERNAL MEDICINE

## 2025-04-10 PROCEDURE — 1036F TOBACCO NON-USER: CPT | Performed by: INTERNAL MEDICINE

## 2025-04-10 PROCEDURE — 36415 COLL VENOUS BLD VENIPUNCTURE: CPT | Performed by: INTERNAL MEDICINE

## 2025-04-10 RX ORDER — ACYCLOVIR 50 MG/G
OINTMENT TOPICAL
Qty: 30 G | Refills: 1 | Status: SHIPPED | OUTPATIENT
Start: 2025-04-10 | End: 2025-04-17

## 2025-04-10 ASSESSMENT — ENCOUNTER SYMPTOMS
COUGH: 0
SORE THROAT: 0
RHINORRHEA: 0
BACK PAIN: 0
CONSTIPATION: 0
NAUSEA: 0
CHEST TIGHTNESS: 0
DIARRHEA: 0
VOICE CHANGE: 0
ABDOMINAL PAIN: 0
SHORTNESS OF BREATH: 0
WHEEZING: 0
VOMITING: 0
EYE PAIN: 0
BLOOD IN STOOL: 0
PHOTOPHOBIA: 0
TROUBLE SWALLOWING: 0

## 2025-04-10 NOTE — PROGRESS NOTES
Venipuncture was obtained from right hand. Patient tolerated the procedure without complications or complaints.  
service on a regular basis.  Continue same for now.  -     Comprehensive Metabolic Panel; Future  -     CBC with Auto Differential; Future  2. PTSD (post-traumatic stress disorder)  Patient was diagnosed with PTSD in 2021.  She follows up with psychiatry as scheduled  -     T4, Free; Future  -     TSH; Future  -     Comprehensive Metabolic Panel; Future  -     CBC with Auto Differential; Future  3. Other specified attention deficit hyperactivity disorder (ADHD)  Patient was initially on Adderall but this was discontinued after her last episode of psychosis in December 2024.  4. History of intravenous drug use  Patient had a history of IV drug use as a teenager but she has been clean since 2015.  She was checked for hepatitis previously.  Will request the reports from her GI specialist.  5. Hyperhidrosis  Patient currently on Robinul 2 mg daily.  Continue same.    -     Comprehensive Metabolic Panel; Future  -     CBC with Auto Differential; Future  6. Memory loss  Patient states that since the episode that she had in December she is having issues with her memory.  Will check her B12 and folate levels.  -     Vitamin B12 & Folate; Future  7. Obesity (BMI 30-39.9)  Check the following blood work and  -     Lipid, Fasting; Future  -     T4, Free; Future  -     TSH; Future      Return in about 6 weeks (around 5/22/2025).       On this date 4/10/25  I have spent 45 minutes reviewing previous notes, test results and face to face with the patient discussing the diagnosis and importance of compliance with the treatment plan as well as documenting on the day of the visit.    An electronic signature was used to authenticate this note.    --Bahaa A Awadalla, MD

## 2025-04-17 ENCOUNTER — TELEPHONE (OUTPATIENT)
Dept: INTERNAL MEDICINE CLINIC | Age: 30
End: 2025-04-17

## 2025-04-17 DIAGNOSIS — F90.8 OTHER SPECIFIED ATTENTION DEFICIT HYPERACTIVITY DISORDER (ADHD): ICD-10-CM

## 2025-04-17 DIAGNOSIS — F41.9 ANXIETY AND DEPRESSION: Primary | ICD-10-CM

## 2025-04-17 DIAGNOSIS — Z87.898 HISTORY OF INTRAVENOUS DRUG USE: ICD-10-CM

## 2025-04-17 DIAGNOSIS — F43.10 PTSD (POST-TRAUMATIC STRESS DISORDER): ICD-10-CM

## 2025-04-17 DIAGNOSIS — F32.A ANXIETY AND DEPRESSION: Primary | ICD-10-CM

## 2025-04-17 NOTE — TELEPHONE ENCOUNTER
Pt asking for a referral  to get a full psych  evaluation---wants referral to dr hayley poe ph # is [phone]  please place if ok ty!

## 2025-04-25 ENCOUNTER — RESULTS FOLLOW-UP (OUTPATIENT)
Dept: OBGYN | Age: 30
End: 2025-04-25

## 2025-04-25 DIAGNOSIS — R61 HYPERHIDROSIS: ICD-10-CM

## 2025-04-28 RX ORDER — GLYCOPYRROLATE 2 MG/1
TABLET ORAL
Qty: 150 TABLET | Refills: 1 | OUTPATIENT
Start: 2025-04-28

## 2025-04-29 DIAGNOSIS — R61 HYPERHIDROSIS: ICD-10-CM

## 2025-04-29 RX ORDER — GLYCOPYRROLATE 2 MG/1
TABLET ORAL
Qty: 150 TABLET | Refills: 1 | Status: SHIPPED | OUTPATIENT
Start: 2025-04-29

## 2025-04-29 NOTE — TELEPHONE ENCOUNTER
Requested Prescriptions     Pending Prescriptions Disp Refills    glycopyrrolate (ROBINUL) 2 MG tablet 150 tablet 1     Sig: Take 5 tablets in am     Last Appointment:  4/10/2025  Future Appointments   Date Time Provider Department Center   3/27/2026 10:30 AM Adama Glez APRN - [facility]

## 2025-05-04 DIAGNOSIS — M54.50 CHRONIC BILATERAL LOW BACK PAIN, UNSPECIFIED WHETHER SCIATICA PRESENT: ICD-10-CM

## 2025-05-04 DIAGNOSIS — M54.9 MID BACK PAIN, CHRONIC: ICD-10-CM

## 2025-05-04 DIAGNOSIS — G89.29 CHRONIC BILATERAL LOW BACK PAIN, UNSPECIFIED WHETHER SCIATICA PRESENT: ICD-10-CM

## 2025-05-04 DIAGNOSIS — G89.29 MID BACK PAIN, CHRONIC: ICD-10-CM

## 2025-05-05 RX ORDER — IBUPROFEN 800 MG/1
800 TABLET, FILM COATED ORAL EVERY 8 HOURS PRN
Qty: 90 TABLET | Refills: 0 | OUTPATIENT
Start: 2025-05-05

## 2025-05-12 ENCOUNTER — PATIENT MESSAGE (OUTPATIENT)
Dept: INTERNAL MEDICINE CLINIC | Age: 30
End: 2025-05-12

## 2025-05-12 RX ORDER — ACYCLOVIR 400 MG/1
400 TABLET ORAL 3 TIMES DAILY
Qty: 15 TABLET | Refills: 0 | Status: SHIPPED | OUTPATIENT
Start: 2025-05-12 | End: 2025-05-17

## 2025-05-14 ENCOUNTER — TELEPHONE (OUTPATIENT)
Dept: INTERNAL MEDICINE CLINIC | Age: 30
End: 2025-05-14

## 2025-05-29 ENCOUNTER — OFFICE VISIT (OUTPATIENT)
Dept: INTERNAL MEDICINE CLINIC | Age: 30
End: 2025-05-29
Payer: COMMERCIAL

## 2025-05-29 VITALS
OXYGEN SATURATION: 98 % | HEART RATE: 110 BPM | TEMPERATURE: 97.6 F | WEIGHT: 213 LBS | SYSTOLIC BLOOD PRESSURE: 110 MMHG | DIASTOLIC BLOOD PRESSURE: 80 MMHG | HEIGHT: 66 IN | BODY MASS INDEX: 34.23 KG/M2

## 2025-05-29 DIAGNOSIS — F32.A ANXIETY AND DEPRESSION: Primary | ICD-10-CM

## 2025-05-29 DIAGNOSIS — E66.9 OBESITY (BMI 30-39.9): ICD-10-CM

## 2025-05-29 DIAGNOSIS — F41.9 ANXIETY AND DEPRESSION: Primary | ICD-10-CM

## 2025-05-29 DIAGNOSIS — M54.9 MID BACK PAIN, CHRONIC: ICD-10-CM

## 2025-05-29 DIAGNOSIS — F43.10 PTSD (POST-TRAUMATIC STRESS DISORDER): ICD-10-CM

## 2025-05-29 DIAGNOSIS — F90.8 OTHER SPECIFIED ATTENTION DEFICIT HYPERACTIVITY DISORDER (ADHD): ICD-10-CM

## 2025-05-29 DIAGNOSIS — G89.29 CHRONIC BILATERAL LOW BACK PAIN, UNSPECIFIED WHETHER SCIATICA PRESENT: ICD-10-CM

## 2025-05-29 DIAGNOSIS — R61 HYPERHIDROSIS: ICD-10-CM

## 2025-05-29 DIAGNOSIS — G89.29 MID BACK PAIN, CHRONIC: ICD-10-CM

## 2025-05-29 DIAGNOSIS — M54.50 CHRONIC BILATERAL LOW BACK PAIN, UNSPECIFIED WHETHER SCIATICA PRESENT: ICD-10-CM

## 2025-05-29 PROCEDURE — 99214 OFFICE O/P EST MOD 30 MIN: CPT | Performed by: INTERNAL MEDICINE

## 2025-05-29 PROCEDURE — 4004F PT TOBACCO SCREEN RCVD TLK: CPT | Performed by: INTERNAL MEDICINE

## 2025-05-29 PROCEDURE — G8427 DOCREV CUR MEDS BY ELIG CLIN: HCPCS | Performed by: INTERNAL MEDICINE

## 2025-05-29 PROCEDURE — G8417 CALC BMI ABV UP PARAM F/U: HCPCS | Performed by: INTERNAL MEDICINE

## 2025-05-29 RX ORDER — GLYCOPYRROLATE 2 MG/1
TABLET ORAL
Qty: 150 TABLET | Refills: 1 | Status: SHIPPED | OUTPATIENT
Start: 2025-05-29

## 2025-05-29 RX ORDER — IBUPROFEN 800 MG/1
800 TABLET, FILM COATED ORAL EVERY 8 HOURS PRN
Qty: 90 TABLET | Refills: 0 | Status: SHIPPED | OUTPATIENT
Start: 2025-05-29

## 2025-05-29 ASSESSMENT — ENCOUNTER SYMPTOMS
NAUSEA: 0
BLOOD IN STOOL: 0
ABDOMINAL PAIN: 0
COUGH: 0
EYE PAIN: 0
CHEST TIGHTNESS: 0
VOMITING: 0
WHEEZING: 0
RHINORRHEA: 0
PHOTOPHOBIA: 0
SHORTNESS OF BREATH: 0
SORE THROAT: 0
TROUBLE SWALLOWING: 0
BACK PAIN: 0
DIARRHEA: 0
CONSTIPATION: 0
VOICE CHANGE: 0

## 2025-05-29 NOTE — PROGRESS NOTES
Maria L Ramos (:  1995) is a 30 y.o. female,Established patient, here for evaluation of the following chief complaint(s):  Discuss Labs (4/10/25)        Subjective   SUBJECTIVE/OBJECTIVE:  HPI  Patient is here for a follow-up today.  She feels well for the most part.  She is trying to lose weight with diet and exercise.  No recent changes with medications.  She follows up with her psychiatrist and psychologist on a regular basis.  Review of Systems   Constitutional:  Negative for chills, fatigue, fever and unexpected weight change.   HENT:  Negative for congestion, postnasal drip, rhinorrhea, sore throat, trouble swallowing and voice change.    Eyes:  Negative for photophobia, pain and visual disturbance.   Respiratory:  Negative for cough, chest tightness, shortness of breath and wheezing.    Cardiovascular:  Negative for chest pain and palpitations.   Gastrointestinal:  Negative for abdominal pain, blood in stool, constipation, diarrhea, nausea and vomiting.   Endocrine: Negative for heat intolerance, polydipsia and polyuria.   Genitourinary:  Negative for difficulty urinating, dysuria, frequency, hematuria and urgency.   Musculoskeletal:  Negative for arthralgias, back pain, neck pain and neck stiffness.   Skin:  Negative for pallor.   Neurological: Negative.  Negative for dizziness and light-headedness.   Hematological:  Does not bruise/bleed easily.          Objective   /80   Pulse (!) 110   Temp 97.6 °F (36.4 °C) (Temporal)   Ht 1.676 m (5' 6\")   Wt 96.6 kg (213 lb)   SpO2 98%   BMI 34.38 kg/m²       The ASCVD Risk score (Katherin DK, et al., 2019) failed to calculate for the following reasons:    The 2019 ASCVD risk score is only valid for ages 40 to 79     Physical Exam  Constitutional:       Appearance: Normal appearance.   HENT:      Head: Normocephalic and atraumatic.      Mouth/Throat:      Pharynx: Oropharynx is clear.   Eyes:      Extraocular Movements: Extraocular movements

## 2025-07-21 DIAGNOSIS — R61 HYPERHIDROSIS: ICD-10-CM

## 2025-07-21 RX ORDER — GLYCOPYRROLATE 2 MG/1
TABLET ORAL
Qty: 150 TABLET | Refills: 2 | Status: SHIPPED | OUTPATIENT
Start: 2025-07-21

## 2025-07-21 NOTE — TELEPHONE ENCOUNTER
"When opening a documentation only encounter, be sure to enter in \"Chief Complaint\" Forms and in \" Comments\" Title of form, description if needed.    Wilfredo is a 61 year old  male  Form received via: Fax  Form now resides in: Provider Ready    Evelina Morton MA                " Name of Medication(s) Requested:  Requested Prescriptions     Pending Prescriptions Disp Refills    glycopyrrolate (ROBINUL) 2 MG tablet 150 tablet 2     Sig: Take 5 tablets in am       Medication is on current medication list {YES / NO:19727::\"Yes\"}    Dosage and directions were verified? {YES/NO:19726::\"Yes\"}    Quantity verified: {30 OR 90:08898} day supply     Pharmacy Verified?  {YES/NO:19726::\"Yes\"}    Last Appointment:  Visit date not found    Future appts:  Future Appointments   Date Time Provider Department Center   3/27/2026 10:30 AM Adama Glez APRN - [facility]        (If no appt send self scheduling link. .REFILLAPPT)  Scheduling request sent?     [] Yes  [] No    Does patient need updated?  [] Yes  [] No